# Patient Record
Sex: MALE | Race: WHITE | NOT HISPANIC OR LATINO | Employment: OTHER | URBAN - METROPOLITAN AREA
[De-identification: names, ages, dates, MRNs, and addresses within clinical notes are randomized per-mention and may not be internally consistent; named-entity substitution may affect disease eponyms.]

---

## 2017-01-03 ENCOUNTER — INITIAL CONSULT (OUTPATIENT)
Dept: DERMATOLOGY | Facility: CLINIC | Age: 82
End: 2017-01-03
Payer: MEDICARE

## 2017-01-03 VITALS — BODY MASS INDEX: 22.07 KG/M2 | HEIGHT: 74 IN | WEIGHT: 172 LBS

## 2017-01-03 DIAGNOSIS — Z80.8 FAMILY HISTORY OF MELANOMA: ICD-10-CM

## 2017-01-03 DIAGNOSIS — D22.9 MULTIPLE BENIGN NEVI: ICD-10-CM

## 2017-01-03 DIAGNOSIS — L57.0 ACTINIC KERATOSES: Primary | ICD-10-CM

## 2017-01-03 DIAGNOSIS — L82.1 SEBORRHEIC KERATOSIS: ICD-10-CM

## 2017-01-03 DIAGNOSIS — L85.3 XEROSIS CUTIS: ICD-10-CM

## 2017-01-03 PROCEDURE — 99203 OFFICE O/P NEW LOW 30 MIN: CPT | Mod: 25,S$PBB,, | Performed by: DERMATOLOGY

## 2017-01-03 PROCEDURE — 17000 DESTRUCT PREMALG LESION: CPT | Mod: S$PBB,,, | Performed by: DERMATOLOGY

## 2017-01-03 PROCEDURE — 99999 PR PBB SHADOW E&M-EST. PATIENT-LVL II: CPT | Mod: PBBFAC,,, | Performed by: DERMATOLOGY

## 2017-01-03 PROCEDURE — 17000 DESTRUCT PREMALG LESION: CPT | Mod: PBBFAC,PO | Performed by: DERMATOLOGY

## 2017-01-03 PROCEDURE — 17003 DESTRUCT PREMALG LES 2-14: CPT | Mod: S$PBB,,, | Performed by: DERMATOLOGY

## 2017-01-03 PROCEDURE — 17003 DESTRUCT PREMALG LES 2-14: CPT | Mod: PBBFAC,PO | Performed by: DERMATOLOGY

## 2017-01-03 PROCEDURE — 99212 OFFICE O/P EST SF 10 MIN: CPT | Mod: PBBFAC,PO | Performed by: DERMATOLOGY

## 2017-01-03 NOTE — LETTER
January 3, 2017      Jass Dominguez MD  [address] [facility]  [address]  [facility] [address]           [place] [facility]  [address] [facility] [place] [address]  Phone: [phone]  Fax: [phone]          Patient: Farnaz Jarquin   MR Number: [id number]   YOB: 1935   Date of Visit: 1/3/2017       Dear Dr. Jass Dominguez:    Thank you for referring Farnaz Jarquin to me for evaluation. Attached you will find relevant portions of my assessment and plan of care.    If you have questions, please do not hesitate to call me. I look forward to following Farnaz Jarquin along with you.    Sincerely,    Coleen Pizarro MD    Enclosure  CC:  No Recipients    If you would like to receive this communication electronically, please contact [email or web address] or [phone] to request more information on [employer] Link access.    For providers and/or their staff who would like to refer a patient to [facility], please contact us through our one-stop-shop provider referral line, [facility], [place], at [phone].    If you feel you have received this communication in error or would no longer like to receive these types of communications, please e-mail [email or web address]

## 2017-01-03 NOTE — PROGRESS NOTES
Subjective:       Patient ID:  Farnaz Jarquin is a 81 y.o. male who presents for   Chief Complaint   Patient presents with    Skin Check     HPI Comments: Pt here for IV skin check. 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      Also c/o dry skin, bilateral ankles. No tx. Asx.   Rough skin upper forehead, months, asx, no tx    Objective:    Physical Exam   Constitutional: He appears well-developed and well-nourished. No distress.   HENT:   Head:       Mouth/Throat: Lips normal.    Eyes: Lids are normal.  No conjunctival no injection.   Cardiovascular: There is no local extremity swelling and no dependent edema.     Neurological: He is alert and oriented to person, place, and time. He is not disoriented.   Psychiatric: He has a normal mood and affect.   Skin:   Areas Examined (abnormalities noted in diagram):   Scalp / Hair Palpated and Inspected  Head / Face Inspection Performed  Neck Inspection Performed  Chest / Axilla Inspection Performed  Abdomen Inspection Performed  Back Inspection Performed  RUE Inspected  LUE Inspection Performed  RLE Inspected  LLE Inspection Performed              Diagram Legend     Erythematous scaling macule/papule c/w actinic keratosis       Vascular papule c/w angioma      Pigmented verrucoid papule/plaque c/w seborrheic keratosis      Yellow umbilicated papule c/w sebaceous hyperplasia      Irregularly shaped tan macule c/w lentigo     1-2 mm smooth white papules consistent with Milia      Movable subcutaneous cyst with punctum c/w epidermal  inclusion cyst      Subcutaneous movable cyst c/w pilar cyst      Firm pink to brown papule c/w dermatofibroma      Pedunculated fleshy papule(s) c/w skin tag(s)      Evenly pigmented macule c/w junctional nevus     Mildly variegated pigmented, slightly irregular-bordered macule c/w mildly atypical nevus      Flesh colored to evenly pigmented papule c/w intradermal nevus       Pink pearly papule/plaque c/w basal cell carcinoma      Erythematous hyperkeratotic cursted plaque c/w SCC      Surgical scar with no sign of skin cancer recurrence      Open and closed comedones      Inflammatory papules and pustules      Verrucoid papule consistent consistent with wart     Erythematous eczematous patches and plaques     Dystrophic onycholytic nail with subungual debris c/w onychomycosis     Umbilicated papule    Erythematous-base heme-crusted tan verrucoid plaque consistent with inflamed seborrheic keratosis     Erythematous Silvery Scaling Plaque c/w Psoriasis     See annotation      Assessment / Plan:        Actinic keratoses  Cryosurgery Procedure Note    Verbal consent from the patient is obtained and the patient is aware of the precancerous quality and need for treatment of these lesions. Liquid nitrogen cryosurgery is applied to the 2 actinic keratoses, as detailed in the physical exam, to produce a freeze injury. The patient is aware that blisters may form and is instructed on wound care with gentle cleansing and use of vaseline ointment to keep moist until healed. The patient is supplied a handout on cryosurgery and is instructed to call if lesions do not completely resolve. Discussed risk postinflammatory pigmentary changes.       Seborrheic keratosis, forehead and trunk  These are benign inherited growths without a malignant potential. Reassurance given to patient. No treatment is necessary.       Multiple benign nevi, trunk and extremities  Discussed ABCDE's of nevi.  Monitor for new mole or moles that are  becoming bigger, darker, irritated, or developing irregular borders.       Xerosis cutis  Discussed the following dry skin tips:    Avoid hot baths and showers, keep showers or baths brief (10-15 min), use a mild soap or cleanser, pat skin dry after showering and apply a moisturizer within 3 minutes of getting out of bath (cetaphil cream, ceraVe, aquaphor) and reapply moisturizers 2-4 times/day.     Family history melanoma    Total body skin examination performed today including at least 12 points as noted in physical examination. No lesions suspicious for malignancy noted.           Return in about 6 months (around 7/3/2017).

## 2017-01-11 ENCOUNTER — OFFICE VISIT (OUTPATIENT)
Dept: SURGERY | Facility: CLINIC | Age: 82
End: 2017-01-11
Payer: MEDICARE

## 2017-01-11 ENCOUNTER — CLINICAL SUPPORT (OUTPATIENT)
Dept: DERMATOLOGY | Facility: CLINIC | Age: 82
End: 2017-01-11
Payer: MEDICARE

## 2017-01-11 VITALS
BODY MASS INDEX: 20.71 KG/M2 | HEIGHT: 74 IN | BODY MASS INDEX: 20.66 KG/M2 | WEIGHT: 161 LBS | SYSTOLIC BLOOD PRESSURE: 141 MMHG | TEMPERATURE: 98 F | HEART RATE: 71 BPM | DIASTOLIC BLOOD PRESSURE: 88 MMHG | HEIGHT: 74 IN | WEIGHT: 161.38 LBS

## 2017-01-11 DIAGNOSIS — R35.1 NOCTURIA: ICD-10-CM

## 2017-01-11 DIAGNOSIS — Z09 POSTOP CHECK: Primary | ICD-10-CM

## 2017-01-11 DIAGNOSIS — T14.8XXD WOUND HEALING, DELAYED: Primary | ICD-10-CM

## 2017-01-11 PROCEDURE — 99999 PR PBB SHADOW E&M-EST. PATIENT-LVL II: CPT | Mod: PBBFAC,,,

## 2017-01-11 PROCEDURE — 99999 PR PBB SHADOW E&M-EST. PATIENT-LVL III: CPT | Mod: PBBFAC,,, | Performed by: SURGERY

## 2017-01-11 PROCEDURE — 99212 OFFICE O/P EST SF 10 MIN: CPT | Mod: PBBFAC,27,PO

## 2017-01-11 PROCEDURE — 99024 POSTOP FOLLOW-UP VISIT: CPT | Mod: ,,, | Performed by: SURGERY

## 2017-01-11 RX ORDER — MUPIROCIN 20 MG/G
OINTMENT TOPICAL
Qty: 30 G | Refills: 2 | Status: SHIPPED | OUTPATIENT
Start: 2017-01-11 | End: 2017-04-03

## 2017-01-11 NOTE — PROGRESS NOTES
Cc: post op    HPI: 81 y.o.  male  6 weeks s/p open L inguinal hernia repair.   Pt notes that he is feeling well.  No pain.  No n/v.  Cont to have issues with urinary incontinece at night.      PE: AFVSS    AAOx3  CTA  Soft/NT/nd  Inc: c/d/i      L testicle slightly raised surrounding induration getting better    A/P:   Pt doing well post surgery.   F/U with me prn  Refer to urology

## 2017-01-11 NOTE — PROGRESS NOTES
"Freeze injury from cryo for Actinic keratoses 1/03/2017 by Dr. Pizarro.  Wound bed red with minor inflammation and IZZY.  States has been putting a "curing" ointment.     Stop otc ointment. Will send bactroban ointment to pharmacy. Recheck wound in 4-6 weeks  "

## 2017-01-11 NOTE — MR AVS SNAPSHOT
[facility]  [address]  [facility] [address]  Phone: [phone]                  Farnaz Jarquin   2017 1:00 PM   Office Visit    Description:  Male : 1935   Provider:  Reymundo Smith MD   Department:  [facility]           Reason for Visit     Post-op Evaluation           Diagnoses this Visit        Comments    Postop check    -  Primary            To Do List           Future Appointments        Provider Department Dept Phone    2017 2:00 PM [facility] Ophthalmology [phone]    2/10/2017 10:00 AM LAB, [place] [facility] [phone]    2/10/2017 10:15 AM URINE [facility] [phone]    4/3/2017 10:15 AM LAB, [place] [facility] [phone]    4/3/2017 10:45 AM [facility] US2 [facility] [phone]      Goals (5 Years of Data)     None      [facility]     [facility] On [facility] Nurse Care Line -  Assistance  Registered nurses in the [facility] provide clinical advisement, health education, appointment booking, and other advisory services.  Call for this free service at [phone].             Medications           Message regarding Medications     Verify the changes and/or additions to your medication regime listed below are the same as discussed with your clinician today.  If any of these changes or additions are incorrect, please notify your healthcare provider.             Verify that the below list of medications is an accurate representation of the medications you are currently taking.  If none reported, the list may be blank. If incorrect, please contact your healthcare provider. Carry this list with you in case of emergency.           Current Medications     CARBOXYMETHYLCELLULOSE SODIUM (LUBRICANT EYE DROPS OPHT) Apply to eye.    COMBIGAN 0.2-0.5 % Drop INSTILL 1 DROP INTO IN LEFT EYE TWICE DAILY    multivitamin capsule  "Take 1 capsule by mouth once daily.    tamsulosin (FLOMAX) 0.4 mg Cp24 Take 1 capsule (0.4 mg total) by mouth once daily.           Clinical Reference Information           Vital Signs - Last Recorded  Most recent update: 1/11/2017  1:14 PM by Freddy David MA    BP Pulse Temp Ht Wt BMI    (!) 141/88 71 98.2 °F (36.8 °C) (Oral) 6' 2" (1.88 m) 73.2 kg (161 lb 6 oz) 20.72 kg/m2      Blood Pressure          Most Recent Value    BP  (!)  141/88      Allergies as of 1/11/2017     No Known Allergies      Immunizations Administered on Date of Encounter - 1/11/2017     None      "

## 2017-01-13 ENCOUNTER — CLINICAL SUPPORT (OUTPATIENT)
Dept: OPHTHALMOLOGY | Facility: CLINIC | Age: 82
End: 2017-01-13
Attending: OPTOMETRIST
Payer: MEDICARE

## 2017-01-13 DIAGNOSIS — H40.001 GLAUCOMA SUSPECT, RIGHT EYE: ICD-10-CM

## 2017-01-13 PROCEDURE — 92083 EXTENDED VISUAL FIELD XM: CPT | Mod: PBBFAC,PO

## 2017-01-13 PROCEDURE — 92083 EXTENDED VISUAL FIELD XM: CPT | Mod: 26,S$PBB,, | Performed by: OPTOMETRIST

## 2017-02-02 ENCOUNTER — OFFICE VISIT (OUTPATIENT)
Dept: UROLOGY | Facility: CLINIC | Age: 82
End: 2017-02-02
Payer: MEDICARE

## 2017-02-02 ENCOUNTER — CLINICAL SUPPORT (OUTPATIENT)
Dept: DERMATOLOGY | Facility: CLINIC | Age: 82
End: 2017-02-02
Payer: MEDICARE

## 2017-02-02 VITALS
HEIGHT: 74 IN | WEIGHT: 160.94 LBS | DIASTOLIC BLOOD PRESSURE: 80 MMHG | HEART RATE: 51 BPM | BODY MASS INDEX: 20.65 KG/M2 | SYSTOLIC BLOOD PRESSURE: 120 MMHG

## 2017-02-02 DIAGNOSIS — N13.8 BPH (BENIGN PROSTATIC HYPERTROPHY) WITH URINARY OBSTRUCTION: ICD-10-CM

## 2017-02-02 DIAGNOSIS — N30.90 CYSTITIS WITHOUT HEMATURIA: ICD-10-CM

## 2017-02-02 DIAGNOSIS — N40.1 BPH (BENIGN PROSTATIC HYPERTROPHY) WITH URINARY OBSTRUCTION: ICD-10-CM

## 2017-02-02 DIAGNOSIS — Z51.89 ENCOUNTER FOR WOUND RE-CHECK: Primary | ICD-10-CM

## 2017-02-02 DIAGNOSIS — R33.9 URINARY RETENTION: ICD-10-CM

## 2017-02-02 DIAGNOSIS — N39.498 OTHER URINARY INCONTINENCE: Primary | ICD-10-CM

## 2017-02-02 LAB
BILIRUB SERPL-MCNC: NEGATIVE MG/DL
BLOOD URINE, POC: 250
COLOR, POC UA: YELLOW
GLUCOSE UR QL STRIP: NORMAL
KETONES UR QL STRIP: NEGATIVE
LEUKOCYTE ESTERASE URINE, POC: NORMAL
NITRITE, POC UA: NORMAL
PH, POC UA: 5
POC RESIDUAL URINE VOLUME: 429 ML (ref 0–100)
PROTEIN, POC: NORMAL
SPECIFIC GRAVITY, POC UA: 1.01
UROBILINOGEN, POC UA: NORMAL

## 2017-02-02 PROCEDURE — 81002 URINALYSIS NONAUTO W/O SCOPE: CPT | Mod: PBBFAC,PO | Performed by: UROLOGY

## 2017-02-02 PROCEDURE — 51798 US URINE CAPACITY MEASURE: CPT | Mod: PBBFAC,PO | Performed by: UROLOGY

## 2017-02-02 PROCEDURE — 99204 OFFICE O/P NEW MOD 45 MIN: CPT | Mod: S$PBB,,, | Performed by: UROLOGY

## 2017-02-02 PROCEDURE — 87088 URINE BACTERIA CULTURE: CPT

## 2017-02-02 PROCEDURE — 87086 URINE CULTURE/COLONY COUNT: CPT

## 2017-02-02 PROCEDURE — 87186 SC STD MICRODIL/AGAR DIL: CPT

## 2017-02-02 PROCEDURE — 99213 OFFICE O/P EST LOW 20 MIN: CPT | Mod: PBBFAC,PO | Performed by: UROLOGY

## 2017-02-02 PROCEDURE — 87077 CULTURE AEROBIC IDENTIFY: CPT

## 2017-02-02 PROCEDURE — 99999 PR PBB SHADOW E&M-EST. PATIENT-LVL III: CPT | Mod: PBBFAC,,, | Performed by: UROLOGY

## 2017-02-02 RX ORDER — TAMSULOSIN HYDROCHLORIDE 0.4 MG/1
0.4 CAPSULE ORAL DAILY
Qty: 30 CAPSULE | Refills: 11 | Status: SHIPPED | OUTPATIENT
Start: 2017-02-02 | End: 2017-08-09

## 2017-02-02 RX ORDER — CIPROFLOXACIN 500 MG/1
500 TABLET ORAL 2 TIMES DAILY
Qty: 20 TABLET | Refills: 0 | Status: SHIPPED | OUTPATIENT
Start: 2017-02-02 | End: 2017-02-12

## 2017-02-02 NOTE — PROGRESS NOTES
Subjective:       Patient ID:  Farnaz Jarquin is a 81 y.o. male who presents for   Chief Complaint   Patient presents with    Wound Check     HPI Comments: Presents today for wound check R arm. S/P cryo 1-3-17 . Site appears pink in color, dry & intact  with no signs or symptoms of infection. Pt advised to contact ofc if needed. 6 month f/u booked & appt handed to pt .     Wound Check         Review of Systems   Hematologic/Lymphatic: Bruises/bleeds easily.        Objective:    Physical Exam       Diagram Legend     Erythematous scaling macule/papule c/w actinic keratosis       Vascular papule c/w angioma      Pigmented verrucoid papule/plaque c/w seborrheic keratosis      Yellow umbilicated papule c/w sebaceous hyperplasia      Irregularly shaped tan macule c/w lentigo     1-2 mm smooth white papules consistent with Milia      Movable subcutaneous cyst with punctum c/w epidermal inclusion cyst      Subcutaneous movable cyst c/w pilar cyst      Firm pink to brown papule c/w dermatofibroma      Pedunculated fleshy papule(s) c/w skin tag(s)      Evenly pigmented macule c/w junctional nevus     Mildly variegated pigmented, slightly irregular-bordered macule c/w mildly atypical nevus      Flesh colored to evenly pigmented papule c/w intradermal nevus       Pink pearly papule/plaque c/w basal cell carcinoma      Erythematous hyperkeratotic cursted plaque c/w SCC      Surgical scar with no sign of skin cancer recurrence      Open and closed comedones      Inflammatory papules and pustules      Verrucoid papule consistent consistent with wart     Erythematous eczematous patches and plaques     Dystrophic onycholytic nail with subungual debris c/w onychomycosis     Umbilicated papule    Erythematous-base heme-crusted tan verrucoid plaque consistent with inflamed seborrheic keratosis     Erythematous Silvery Scaling Plaque c/w Psoriasis     See annotation      Assessment / Plan:        There are no diagnoses linked to  this encounter.         No Follow-up on file.
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
## 2017-02-02 NOTE — LETTER
February 2, 2017      Reymundo Smith MD  [address] [place] [address]           [place] [facility]  [address] [place] [address]  Phone: [phone]          Patient: Farnaz Jarquin   MR Number: [id number]   YOB: 1935   Date of Visit: 2/2/2017       Dear Dr. Reymundo Smith:    Thank you for referring Farnaz Jarquin to me for evaluation. Attached you will find relevant portions of my assessment and plan of care.    If you have questions, please do not hesitate to call me. I look forward to following Farnaz Jarquin along with you.    Sincerely,    RHEA Ramos MD    Enclosure  CC:  No Recipients    If you would like to receive this communication electronically, please contact [email or web address] or [phone] to request more information on [employer] Link access.    For providers and/or their staff who would like to refer a patient to [facility], please contact us through our one-stop-shop provider referral line, [facility], at [phone].    If you feel you have received this communication in error or would no longer like to receive these types of communications, please e-mail [email or web address]

## 2017-02-06 LAB — BACTERIA UR CULT: NORMAL

## 2017-02-07 ENCOUNTER — TELEPHONE (OUTPATIENT)
Dept: UROLOGY | Facility: CLINIC | Age: 82
End: 2017-02-07

## 2017-02-07 NOTE — TELEPHONE ENCOUNTER
Patient notified of urine culture results, had to r/s office visit to Friday as patient unable to make f/u on Thursday.

## 2017-02-07 NOTE — TELEPHONE ENCOUNTER
----- Message from RHEA Ramos MD sent at 2/7/2017  8:57 AM CST -----  Call patient with culture results.   Positive for resistant organism.  Continue Cipro for now.  Keep f/u as planned this week

## 2017-02-10 ENCOUNTER — LAB VISIT (OUTPATIENT)
Dept: LAB | Facility: HOSPITAL | Age: 82
End: 2017-02-10
Attending: PEDIATRICS
Payer: MEDICARE

## 2017-02-10 ENCOUNTER — OFFICE VISIT (OUTPATIENT)
Dept: UROLOGY | Facility: CLINIC | Age: 82
End: 2017-02-10
Payer: MEDICARE

## 2017-02-10 VITALS
HEART RATE: 67 BPM | HEIGHT: 74 IN | BODY MASS INDEX: 20.65 KG/M2 | WEIGHT: 160.94 LBS | DIASTOLIC BLOOD PRESSURE: 81 MMHG | SYSTOLIC BLOOD PRESSURE: 130 MMHG

## 2017-02-10 DIAGNOSIS — N18.30 CKD (CHRONIC KIDNEY DISEASE) STAGE 3, GFR 30-59 ML/MIN: ICD-10-CM

## 2017-02-10 DIAGNOSIS — N30.90 CYSTITIS WITHOUT HEMATURIA: Primary | ICD-10-CM

## 2017-02-10 DIAGNOSIS — R33.9 URINARY RETENTION: ICD-10-CM

## 2017-02-10 DIAGNOSIS — N13.8 BPH (BENIGN PROSTATIC HYPERTROPHY) WITH URINARY OBSTRUCTION: ICD-10-CM

## 2017-02-10 DIAGNOSIS — N40.1 BPH (BENIGN PROSTATIC HYPERTROPHY) WITH URINARY OBSTRUCTION: ICD-10-CM

## 2017-02-10 LAB
ALBUMIN SERPL BCP-MCNC: 3.7 G/DL
ANION GAP SERPL CALC-SCNC: 7 MMOL/L
BILIRUB SERPL-MCNC: ABNORMAL MG/DL
BLOOD URINE, POC: ABNORMAL
BUN SERPL-MCNC: 26 MG/DL
CALCIUM SERPL-MCNC: 9.5 MG/DL
CHLORIDE SERPL-SCNC: 107 MMOL/L
CO2 SERPL-SCNC: 27 MMOL/L
COLOR, POC UA: YELLOW
CREAT SERPL-MCNC: 2.2 MG/DL
EST. GFR  (AFRICAN AMERICAN): 31.5 ML/MIN/1.73 M^2
EST. GFR  (NON AFRICAN AMERICAN): 27.3 ML/MIN/1.73 M^2
GLUCOSE SERPL-MCNC: 86 MG/DL
GLUCOSE UR QL STRIP: ABNORMAL
KETONES UR QL STRIP: ABNORMAL
LEUKOCYTE ESTERASE URINE, POC: ABNORMAL
NITRITE, POC UA: ABNORMAL
PH, POC UA: 5
PHOSPHATE SERPL-MCNC: 3.8 MG/DL
POC RESIDUAL URINE VOLUME: 185 ML (ref 0–100)
POTASSIUM SERPL-SCNC: 4.7 MMOL/L
PROTEIN, POC: ABNORMAL
PTH-INTACT SERPL-MCNC: 124 PG/ML
SODIUM SERPL-SCNC: 141 MMOL/L
SPECIFIC GRAVITY, POC UA: 1.01
UROBILINOGEN, POC UA: ABNORMAL

## 2017-02-10 PROCEDURE — 87086 URINE CULTURE/COLONY COUNT: CPT

## 2017-02-10 PROCEDURE — 81002 URINALYSIS NONAUTO W/O SCOPE: CPT | Mod: PBBFAC,PO | Performed by: UROLOGY

## 2017-02-10 PROCEDURE — 99999 PR PBB SHADOW E&M-EST. PATIENT-LVL III: CPT | Mod: PBBFAC,,, | Performed by: UROLOGY

## 2017-02-10 PROCEDURE — 99213 OFFICE O/P EST LOW 20 MIN: CPT | Mod: S$PBB,,, | Performed by: UROLOGY

## 2017-02-10 PROCEDURE — 51798 US URINE CAPACITY MEASURE: CPT | Mod: PBBFAC,PO | Performed by: UROLOGY

## 2017-02-10 PROCEDURE — 99213 OFFICE O/P EST LOW 20 MIN: CPT | Mod: PBBFAC,PO | Performed by: UROLOGY

## 2017-02-10 NOTE — PROGRESS NOTES
Subjective:       Patient ID: Farnaz Jarquin is a 81 y.o. male.    Chief Complaint: Follow-up (1 week with PVR)    HPI     81 year old recent left hernia repair. Since surgery he has had more difficulty voiding.  He denies hematuria and dsyuria.  Urine culture was neg.  He was given Flomax and his symptoms are   improved.  Repeat PVR is 185 ml with is improved from last week.    Urine dipstick shows negative for nitrites, glucose, protein, positive for 1+leukocytes, trace blood    Review of Systems   Constitutional: Negative for fever.   Genitourinary: Negative for dysuria and hematuria.       Objective:      Physical Exam   Constitutional: He is oriented to person, place, and time. He appears well-developed and well-nourished.   Pulmonary/Chest: Effort normal.   Abdominal: Soft.   Neurological: He is alert and oriented to person, place, and time.   Skin: No rash noted.   Psychiatric: He has a normal mood and affect.   Vitals reviewed.      Assessment:       1. Cystitis without hematuria    2. BPH (benign prostatic hypertrophy) with urinary obstruction    3. Urinary retention        Plan:       Cystitis without hematuria  -     Urine culture  -     POCT URINE DIPSTICK WITHOUT MICROSCOPE  -     POCT Bladder Scan    BPH (benign prostatic hypertrophy) with urinary obstruction  -     POCT URINE DIPSTICK WITHOUT MICROSCOPE  -     POCT Bladder Scan    Urinary retention  -     POCT URINE DIPSTICK WITHOUT MICROSCOPE  -     POCT Bladder Scan      Continue Flomax.  Follow-up 1 month with flow rate and postvoid void residual.

## 2017-02-10 NOTE — MR AVS SNAPSHOT
[facility]  [address]  [facility] [address]  Phone: [phone]                  Farnaz Jarquin   2/10/2017 1:45 PM   Office Visit    Description:  Male : 1935   Provider:  RHEA Ramos MD   Department:  [facility]           Reason for Visit     Follow-up           Diagnoses this Visit        Comments    Cystitis without hematuria    -  Primary     BPH (benign prostatic hypertrophy) with urinary obstruction         Urinary retention                To Do List           Future Appointments        Provider Department Dept Phone    3/22/2017 2:45 PM RHEA Ramos MD [facility] [phone]    4/3/2017 10:15 AM LAB, [place] [facility] [phone]    4/3/2017 10:45 AM [facility] US2 [facility] [phone]    4/10/2017 2:20 PM Jass Dominguez MD [facility] Nephrology [phone]    2017 1:45 PM Coleen Pizarro MD [facility] Dermatology [phone]      Goals (5 Years of Data)     None      [facility]     [facility] On [facility] Nurse Care Line - 24/7 Assistance  Registered nurses in the [facility] provide clinical advisement, health education, appointment booking, and other advisory services.  Call for this free service at [phone].             Medications           Message regarding Medications     Verify the changes and/or additions to your medication regime listed below are the same as discussed with your clinician today.  If any of these changes or additions are incorrect, please notify your healthcare provider.             Verify that the below list of medications is an accurate representation of the medications you are currently taking.  If none reported, the list may be blank. If incorrect, please contact your healthcare provider. Carry this list with you in case of emergency.           Current Medications     CARBOXYMETHYLCELLULOSE SODIUM (LUBRICANT EYE DROPS OPHT) Apply to eye.     "ciprofloxacin HCl (CIPRO) 500 MG tablet Take 1 tablet (500 mg total) by mouth 2 (two) times daily.    COMBIGAN 0.2-0.5 % Drop INSTILL 1 DROP INTO IN LEFT EYE TWICE DAILY    multivitamin capsule Take 1 capsule by mouth once daily.    mupirocin (BACTROBAN) 2 % ointment AAA bid    tamsulosin (FLOMAX) 0.4 mg Cp24 Take 1 capsule (0.4 mg total) by mouth once daily.           Clinical Reference Information           Your Vitals Were     BP Pulse Height Weight BMI    130/81 67 6' 2" (1.88 m) 73 kg (160 lb 15 oz) 20.66 kg/m2      Blood Pressure          Most Recent Value    BP  130/81      Allergies as of 2/10/2017     No Known Allergies      Immunizations Administered on Date of Encounter - 2/10/2017     None      Orders Placed During Today's Visit      Normal Orders This Visit    Urine culture       Language Assistance Services     ATTENTION: Language assistance services are available, free of charge. Please call [phone].      ATENCIÓN: Si habla pedro, tiene a masters disposición servicios gratuitos de asistencia lingüística. Llame al [phone].     REUBEN Ý: N?u b?n nói Ti?ng Vi?t, có các d?ch v? h? tr? ngôn ng? mi?n phí dành cho b?n. G?i s? [phone].         [facility] complies with applicable Federal civil rights laws and does not discriminate on the basis of race, color, national origin, age, disability, or sex.        "

## 2017-02-11 LAB — BACTERIA UR CULT: NO GROWTH

## 2017-03-15 RX ORDER — BRIMONIDINE TARTRATE, TIMOLOL MALEATE 2; 5 MG/ML; MG/ML
SOLUTION/ DROPS OPHTHALMIC
Qty: 10 ML | Refills: 0 | Status: SHIPPED | OUTPATIENT
Start: 2017-03-15 | End: 2017-08-03 | Stop reason: SDUPTHER

## 2017-03-22 ENCOUNTER — OFFICE VISIT (OUTPATIENT)
Dept: UROLOGY | Facility: CLINIC | Age: 82
End: 2017-03-22
Payer: MEDICARE

## 2017-03-22 VITALS
HEIGHT: 74 IN | BODY MASS INDEX: 20.26 KG/M2 | HEART RATE: 76 BPM | WEIGHT: 157.88 LBS | DIASTOLIC BLOOD PRESSURE: 62 MMHG | SYSTOLIC BLOOD PRESSURE: 145 MMHG

## 2017-03-22 DIAGNOSIS — N40.0 BENIGN PROSTATIC HYPERPLASIA, PRESENCE OF LOWER URINARY TRACT SYMPTOMS UNSPECIFIED, UNSPECIFIED MORPHOLOGY: Primary | ICD-10-CM

## 2017-03-22 DIAGNOSIS — N30.90 CYSTITIS WITHOUT HEMATURIA: ICD-10-CM

## 2017-03-22 LAB
BILIRUB SERPL-MCNC: ABNORMAL MG/DL
BLOOD URINE, POC: ABNORMAL
COLOR, POC UA: YELLOW
GLUCOSE UR QL STRIP: ABNORMAL
KETONES UR QL STRIP: ABNORMAL
LEUKOCYTE ESTERASE URINE, POC: ABNORMAL
NITRITE, POC UA: ABNORMAL
PH, POC UA: 5
PROTEIN, POC: ABNORMAL
SPECIFIC GRAVITY, POC UA: 1
UROBILINOGEN, POC UA: ABNORMAL

## 2017-03-22 PROCEDURE — 99213 OFFICE O/P EST LOW 20 MIN: CPT | Mod: PBBFAC,PO | Performed by: UROLOGY

## 2017-03-22 PROCEDURE — 99999 PR PBB SHADOW E&M-EST. PATIENT-LVL III: CPT | Mod: PBBFAC,,, | Performed by: UROLOGY

## 2017-03-22 PROCEDURE — 87088 URINE BACTERIA CULTURE: CPT

## 2017-03-22 PROCEDURE — 87086 URINE CULTURE/COLONY COUNT: CPT

## 2017-03-22 PROCEDURE — 99213 OFFICE O/P EST LOW 20 MIN: CPT | Mod: S$PBB,,, | Performed by: UROLOGY

## 2017-03-22 PROCEDURE — 81002 URINALYSIS NONAUTO W/O SCOPE: CPT | Mod: PBBFAC,PO | Performed by: UROLOGY

## 2017-03-22 PROCEDURE — 87186 SC STD MICRODIL/AGAR DIL: CPT

## 2017-03-22 PROCEDURE — 87077 CULTURE AEROBIC IDENTIFY: CPT

## 2017-03-22 RX ORDER — CIPROFLOXACIN 500 MG/1
500 TABLET ORAL 2 TIMES DAILY
Qty: 28 TABLET | Refills: 0 | Status: SHIPPED | OUTPATIENT
Start: 2017-03-22 | End: 2017-03-22 | Stop reason: ALTCHOICE

## 2017-03-22 RX ORDER — AMOXICILLIN AND CLAVULANATE POTASSIUM 875; 125 MG/1; MG/1
1 TABLET, FILM COATED ORAL 2 TIMES DAILY
Qty: 28 TABLET | Refills: 0 | Status: SHIPPED | OUTPATIENT
Start: 2017-03-22 | End: 2017-04-03

## 2017-03-22 NOTE — MR AVS SNAPSHOT
[facility]  [address]  [facility] [address]  Phone: [phone]                  Farnaz Jarquin   3/22/2017 2:45 PM   Office Visit    Description:  Male : 1935   Provider:  RHEA Ramos MD   Department:  [facility]           Reason for Visit     Benign Prostatic Hypertrophy                To Do List           Future Appointments        Provider Department Dept Phone    4/3/2017 10:15 AM LAB, [place] [facility] [phone]    4/3/2017 10:45 AM [facility] [phone]    4/10/2017 2:20 PM Jass Dominguez MD [facility] [phone]    2017 1:45 PM Coleen Pizarro MD [facility] [phone]      Goals (5 Years of Data)     None      [facility]     [facility] Nurse Care Line -  Assistance  Registered nurses in the [facility] provide clinical advisement, health education, appointment booking, and other advisory services.  Call for this free service at [phone].             Medications           Message regarding Medications     Verify the changes and/or additions to your medication regime listed below are the same as discussed with your clinician today.  If any of these changes or additions are incorrect, please notify your healthcare provider.             Verify that the below list of medications is an accurate representation of the medications you are currently taking.  If none reported, the list may be blank. If incorrect, please contact your healthcare provider. Carry this list with you in case of emergency.           Current Medications     CARBOXYMETHYLCELLULOSE SODIUM (LUBRICANT EYE DROPS OPHT) Apply to eye.    COMBIGAN 0.2-0.5 % Drop INSTILL 1 DROP INTO LEFT EYE TWICE DAILY    multivitamin capsule Take 1 capsule by mouth once daily.    mupirocin (BACTROBAN) 2 % ointment AAA bid    tamsulosin (FLOMAX) 0.4 mg Cp24 Take 1 capsule (0.4 mg total) by  "mouth once daily.           Clinical Reference Information           Your Vitals Were     BP Pulse Height Weight BMI    145/62 (BP Location: Right arm) 76 6' 2" (1.88 m) 71.6 kg (157 lb 13.6 oz) 20.27 kg/m2      Blood Pressure          Most Recent Value    BP  (!)  145/62      Allergies as of 3/22/2017     No Known Allergies      Immunizations Administered on Date of Encounter - 3/22/2017     None      Language Assistance Services     ATTENTION: Language assistance services are available, free of charge. Please call [phone].      ATENCIÓN: Si habla español, tiene a masters disposición servicios gratuitos de asistencia lingüística. Llame al [phone].     CHÚ Ý: N?u b?n nói Ti?ng Vi?t, có các d?ch v? h? tr? ngôn ng? mi?n phí dành cho b?n. G?i s? [phone].         [facility] complies with applicable Federal civil rights laws and does not discriminate on the basis of race, color, national origin, age, disability, or sex.        "

## 2017-03-22 NOTE — PROGRESS NOTES
Subjective:       Patient ID: Farnaz Jarquin is a 81 y.o. male.    Chief Complaint: Benign Prostatic Hypertrophy    HPI     81 year old left hernia repair last month.  After surgery he has had more difficulty voiding.  He was given Flomax and was doing well.  He was scheduled for follow-up and FR and PVR.  He says in the last 2 days his symptoms have changed.  He is having more urinary frequency and low back pain.  He denies hematuria and dsyuria.   Previous urine culture grew staph epi and he was treated with a course of Cipro.   Urine dipstick shows positive for nitrates.  Micro exam: 15-20 WBC's per HPF and 2+ bacteria.    Review of Systems   Constitutional: Negative for fever.   Genitourinary: Negative for dysuria and hematuria.       Objective:      Physical Exam   Constitutional: He is oriented to person, place, and time. He appears well-developed and well-nourished.   Pulmonary/Chest: Effort normal.   Abdominal: Soft.   Neurological: He is alert and oriented to person, place, and time.   Skin: No rash noted.   Psychiatric: He has a normal mood and affect.   Vitals reviewed.      Assessment:       1. Benign prostatic hyperplasia, presence of lower urinary tract symptoms unspecified, unspecified morphology    2. Cystitis without hematuria        Plan:       Benign prostatic hyperplasia, presence of lower urinary tract symptoms unspecified, unspecified morphology  -     POCT URINE DIPSTICK WITHOUT MICROSCOPE    Cystitis without hematuria  -     Urine culture  -     Discontinue: ciprofloxacin HCl (CIPRO) 500 MG tablet; Take 1 tablet (500 mg total) by mouth 2 (two) times daily.  Dispense: 28 tablet; Refill: 0    Other orders  -     amoxicillin-clavulanate 875-125mg (AUGMENTIN) 875-125 mg per tablet; Take 1 tablet by mouth 2 (two) times daily.  Dispense: 28 tablet; Refill: 0      f/u 3 months

## 2017-03-25 LAB — BACTERIA UR CULT: NORMAL

## 2017-03-27 RX ORDER — NITROFURANTOIN 25; 75 MG/1; MG/1
100 CAPSULE ORAL 2 TIMES DAILY
Qty: 20 CAPSULE | Refills: 0 | Status: SHIPPED | OUTPATIENT
Start: 2017-03-27 | End: 2017-04-06

## 2017-03-28 ENCOUNTER — TELEPHONE (OUTPATIENT)
Dept: UROLOGY | Facility: CLINIC | Age: 82
End: 2017-03-28

## 2017-03-28 NOTE — TELEPHONE ENCOUNTER
----- Message from Yadi Garcia sent at 3/28/2017  9:54 AM CDT -----  Contact: Patient  Place call to pod,Patient returning a call. Please call back at [phone]. Thanks,

## 2017-03-28 NOTE — TELEPHONE ENCOUNTER
----- Message from RHEA Ramos MD sent at 3/27/2017  5:59 PM CDT -----  Call with urine culture.  Resistant to Augmentin.  Macrobid called into pharmacy

## 2017-03-30 NOTE — TELEPHONE ENCOUNTER
Spoke to pt and informed him of the urine culture results and about changing his antibiotic. Pt verbalized understanding.

## 2017-04-03 ENCOUNTER — HOSPITAL ENCOUNTER (OUTPATIENT)
Dept: RADIOLOGY | Facility: HOSPITAL | Age: 82
Discharge: HOME | End: 2017-04-03
Attending: INTERNAL MEDICINE
Payer: MEDICARE

## 2017-04-03 ENCOUNTER — TELEPHONE (OUTPATIENT)
Dept: UROLOGY | Facility: CLINIC | Age: 82
End: 2017-04-03

## 2017-04-03 ENCOUNTER — OFFICE VISIT (OUTPATIENT)
Dept: UROLOGY | Facility: CLINIC | Age: 82
End: 2017-04-03
Payer: MEDICARE

## 2017-04-03 VITALS
HEART RATE: 77 BPM | BODY MASS INDEX: 20.37 KG/M2 | HEIGHT: 74 IN | WEIGHT: 158.75 LBS | SYSTOLIC BLOOD PRESSURE: 114 MMHG | DIASTOLIC BLOOD PRESSURE: 75 MMHG

## 2017-04-03 DIAGNOSIS — N20.0 CALCIUM OXALATE KIDNEY STONES: ICD-10-CM

## 2017-04-03 DIAGNOSIS — N30.90 CYSTITIS WITHOUT HEMATURIA: Primary | ICD-10-CM

## 2017-04-03 DIAGNOSIS — N20.1 URETERAL STONE: ICD-10-CM

## 2017-04-03 DIAGNOSIS — N13.2 HYDRONEPHROSIS WITH OBSTRUCTING CALCULUS: ICD-10-CM

## 2017-04-03 DIAGNOSIS — N18.4 CKD (CHRONIC KIDNEY DISEASE) STAGE 4, GFR 15-29 ML/MIN: ICD-10-CM

## 2017-04-03 LAB
BILIRUB SERPL-MCNC: NORMAL MG/DL
BLOOD URINE, POC: NORMAL
COLOR, POC UA: YELLOW
GLUCOSE UR QL STRIP: NORMAL
KETONES UR QL STRIP: NORMAL
LEUKOCYTE ESTERASE URINE, POC: NORMAL
NITRITE, POC UA: NORMAL
PH, POC UA: 5
PROTEIN, POC: 30
SPECIFIC GRAVITY, POC UA: 1.01
UROBILINOGEN, POC UA: NORMAL

## 2017-04-03 PROCEDURE — 99999 PR PBB SHADOW E&M-EST. PATIENT-LVL III: CPT | Mod: PBBFAC,,, | Performed by: UROLOGY

## 2017-04-03 PROCEDURE — 99213 OFFICE O/P EST LOW 20 MIN: CPT | Mod: S$PBB,,, | Performed by: UROLOGY

## 2017-04-03 PROCEDURE — 99213 OFFICE O/P EST LOW 20 MIN: CPT | Mod: PBBFAC,PO | Performed by: UROLOGY

## 2017-04-03 PROCEDURE — 81002 URINALYSIS NONAUTO W/O SCOPE: CPT | Mod: PBBFAC,PO | Performed by: UROLOGY

## 2017-04-03 PROCEDURE — 76770 US EXAM ABDO BACK WALL COMP: CPT | Mod: 26,,, | Performed by: RADIOLOGY

## 2017-04-03 NOTE — PROGRESS NOTES
Subjective:       Patient ID: Farnaz Jarquin is a 81 y.o. male.    Chief Complaint: Results (ultrsound shows hydronephrosis)    HPI     81 year old left hernia repair last month. After surgery he has had more difficulty voiding. He was given Flomax and was doing well. He was scheduled for follow-up and FR and PVR.  Urine culture last week grew staph epi resistant to Cipro.  He was then given Macrobid and has taken for the last few days and he feels better.  Had renal US with showed right hydro and an obstructing right UPJ stone.  He denies flank pain.    Urine dipstick shows negative for nitrites, leukocytes, red blood cells, glucose, positive for protein.    Review of Systems   Constitutional: Negative for fever.   Genitourinary: Negative for dysuria and hematuria.       Objective:      Physical Exam   Constitutional: He is oriented to person, place, and time. He appears well-developed and well-nourished.   Pulmonary/Chest: Effort normal.   Abdominal: Soft. There is no CVA tenderness.   Neurological: He is alert and oriented to person, place, and time.   Skin: No rash noted.   Psychiatric: He has a normal mood and affect.   Vitals reviewed.      Assessment:       1. Cystitis without hematuria    2. Ureteral stone    3. Hydronephrosis with obstructing calculus        Plan:       Cystitis without hematuria  -     POCT urine dipstick without microscope    Ureteral stone  -     CT Renal Stone Study ABD Pelvis WO; Future; Expected date: 4/3/17  -     POCT urine dipstick without microscope    Hydronephrosis with obstructing calculus  -     POCT urine dipstick without microscope      CT scan today.   RTC after scan

## 2017-04-03 NOTE — MR AVS SNAPSHOT
[facility]  [address]  [facility] [address]  Phone: [phone]                  Farnaz Jarquin   4/3/2017 1:00 PM   Office Visit    Description:  Male : 1935   Provider:  RHEA Ramos MD   Department:  [facility]           Reason for Visit     Results                To Do List           Future Appointments        Provider Department Dept Phone    4/10/2017 2:20 PM Jass Dominguez MD [facility] Neph[facility] [phone]    2017 2:00 PM RHEA Ramos MD [facility] [phone]    2017 1:45 PM Coleen Pizarro MD [facility] Dermat[facility] [phone]      Goals (5 Years of Data)     None      [facility]     [facility] Care [facility] -  Assistance  Unless otherwise directed by your provider, please contact [facility], our nurse care line that is available for  assistance.     Registered nurses in the [facility] provide: appointment scheduling, clinical advisement, health education, and other advisory services.  Call: [phone] (toll free)               Medications           Message regarding Medications     Verify the changes and/or additions to your medication regime listed below are the same as discussed with your clinician today.  If any of these changes or additions are incorrect, please notify your healthcare provider.        STOP taking these medications     amoxicillin-clavulanate 875-125mg (AUGMENTIN) 875-125 mg per tablet Take 1 tablet by mouth 2 (two) times daily.    mupirocin (BACTROBAN) 2 % ointment AAA bid           Verify that the below list of medications is an accurate representation of the medications you are currently taking.  If none reported, the list may be blank. If incorrect, please contact your healthcare provider. Carry this list with you in case of emergency.           Current Medications     CARBOXYMETHYLCELLULOSE SODIUM (LUBRICANT EYE DROPS OPHT) Apply to eye.    COMBIGAN  "0.2-0.5 % Drop INSTILL 1 DROP INTO LEFT EYE TWICE DAILY    multivitamin capsule Take 1 capsule by mouth once daily.    nitrofurantoin, macrocrystal-monohydrate, (MACROBID) 100 MG capsule Take 1 capsule (100 mg total) by mouth 2 (two) times daily.    tamsulosin (FLOMAX) 0.4 mg Cp24 Take 1 capsule (0.4 mg total) by mouth once daily.           Clinical Reference Information           Your Vitals Were     BP Pulse Height Weight BMI    114/75 77 6' 2" (1.88 m) 72 kg (158 lb 11.7 oz) 20.38 kg/m2      Blood Pressure          Most Recent Value    BP  114/75      Allergies as of 4/3/2017     No Known Allergies      Immunizations Administered on Date of Encounter - 4/3/2017     None      Language Assistance Services     ATTENTION: Language assistance services are available, free of charge. Please call [phone].      ATENCIÓN: Si habla pedro, tiene a masters disposición servicios gratuitos de asistencia lingüística. Llame al [phone].     REUBEN Ý: N?u b?n nói Ti?ng Vi?t, có các d?ch v? h? tr? ngôn ng? mi?n phí dành cho b?n. G?i s? [phone].         [facility] complies with applicable Federal civil rights laws and does not discriminate on the basis of race, color, national origin, age, disability, or sex.        "

## 2017-04-03 NOTE — TELEPHONE ENCOUNTER
Spoke to pt and rescheduled him for his ct. He went to the lab desk and they told him he had all his test done for the day and he could go.

## 2017-04-04 ENCOUNTER — HOSPITAL ENCOUNTER (OUTPATIENT)
Dept: RADIOLOGY | Facility: HOSPITAL | Age: 82
Discharge: HOME | End: 2017-04-04
Attending: UROLOGY
Payer: MEDICARE

## 2017-04-04 DIAGNOSIS — N20.1 URETERAL STONE: ICD-10-CM

## 2017-04-04 PROCEDURE — 74176 CT ABD & PELVIS W/O CONTRAST: CPT | Mod: TC,PO

## 2017-04-04 PROCEDURE — 74176 CT ABD & PELVIS W/O CONTRAST: CPT | Mod: 26,,, | Performed by: RADIOLOGY

## 2017-04-05 ENCOUNTER — TELEPHONE (OUTPATIENT)
Dept: UROLOGY | Facility: CLINIC | Age: 82
End: 2017-04-05

## 2017-04-05 DIAGNOSIS — N20.1 URETERAL STONE: Primary | ICD-10-CM

## 2017-04-05 DIAGNOSIS — N20.0 KIDNEY STONE: Primary | ICD-10-CM

## 2017-04-05 NOTE — TELEPHONE ENCOUNTER
----- Message from RHEA Ramos MD sent at 4/5/2017  2:26 PM CDT -----  Call with CT scan results.  Large obstructing right ureteral stone.  I recommend ESWL.  Schedule case of follow-up if he wants to proceed.

## 2017-04-05 NOTE — TELEPHONE ENCOUNTER
Spoke to pt and I am schedule him for a Right side lithotripsy. I am mailing him instructions. I went over them with him on the phone. pT verbalized understanding.

## 2017-04-06 ENCOUNTER — TELEPHONE (OUTPATIENT)
Dept: UROLOGY | Facility: CLINIC | Age: 82
End: 2017-04-06

## 2017-04-06 NOTE — TELEPHONE ENCOUNTER
----- Message from RHEA Ramos MD sent at 4/5/2017  5:15 PM CDT -----  I have ordered a KUB to be done before lithotripsy

## 2017-04-10 ENCOUNTER — ANESTHESIA EVENT (OUTPATIENT)
Dept: SURGERY | Facility: HOSPITAL | Age: 82
End: 2017-04-10
Payer: MEDICARE

## 2017-04-10 ENCOUNTER — OFFICE VISIT (OUTPATIENT)
Dept: NEPHROLOGY | Facility: CLINIC | Age: 82
End: 2017-04-10
Payer: MEDICARE

## 2017-04-10 VITALS
TEMPERATURE: 98 F | HEART RATE: 74 BPM | HEIGHT: 74 IN | OXYGEN SATURATION: 97 % | DIASTOLIC BLOOD PRESSURE: 80 MMHG | SYSTOLIC BLOOD PRESSURE: 102 MMHG | WEIGHT: 160.94 LBS | BODY MASS INDEX: 20.65 KG/M2

## 2017-04-10 DIAGNOSIS — N20.0 CALCIUM OXALATE KIDNEY STONES: ICD-10-CM

## 2017-04-10 DIAGNOSIS — N18.4 CKD (CHRONIC KIDNEY DISEASE) STAGE 4, GFR 15-29 ML/MIN: Primary | ICD-10-CM

## 2017-04-10 DIAGNOSIS — Z79.1 NSAID LONG-TERM USE: ICD-10-CM

## 2017-04-10 DIAGNOSIS — N18.4 CKD (CHRONIC KIDNEY DISEASE), STAGE 4 (SEVERE): Primary | ICD-10-CM

## 2017-04-10 PROCEDURE — 99213 OFFICE O/P EST LOW 20 MIN: CPT | Mod: PBBFAC,PO | Performed by: INTERNAL MEDICINE

## 2017-04-10 PROCEDURE — 99213 OFFICE O/P EST LOW 20 MIN: CPT | Mod: S$PBB,,, | Performed by: INTERNAL MEDICINE

## 2017-04-10 PROCEDURE — 99499 UNLISTED E&M SERVICE: CPT | Mod: S$PBB,,, | Performed by: INTERNAL MEDICINE

## 2017-04-10 PROCEDURE — 99999 PR PBB SHADOW E&M-EST. PATIENT-LVL III: CPT | Mod: PBBFAC,,, | Performed by: INTERNAL MEDICINE

## 2017-04-10 NOTE — PROGRESS NOTES
Subjective:       Patient ID: Farnaz Jarquin is a 81 y.o. White male who presents for follow-up evaluation of Chronic Kidney Disease    HPI     He underwent renal u/s and was noted to have right sided hydro from a stone. He is scheduled tomorrow for lithotripsy tomorrow. No hematuria, no flank pain and no fever. His appetite has decreased, lost 12 lbs since last visit. No edema nor SOB. No further NSAID use    Review of Systems   Constitutional: Positive for appetite change and unexpected weight change. Negative for fever.   Respiratory: Negative for shortness of breath.    Cardiovascular: Negative for chest pain and leg swelling.   Gastrointestinal: Negative for constipation and diarrhea.   Genitourinary: Negative for difficulty urinating, flank pain and hematuria.   Musculoskeletal: Positive for arthralgias and back pain.   Neurological: Negative for weakness and headaches.       Objective:      Physical Exam   Constitutional: He is oriented to person, place, and time. He appears well-developed and well-nourished. No distress.   Neck: No JVD present.   Cardiovascular: S1 normal and S2 normal.  Exam reveals no friction rub.    Pulmonary/Chest: Breath sounds normal. He has no wheezes. He has no rales.   Abdominal: Soft.   Musculoskeletal: He exhibits no edema.   Neurological: He is alert and oriented to person, place, and time.   Skin: Skin is warm and dry.   Psychiatric: He has a normal mood and affect.   Vitals reviewed.      Assessment:       1. 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 NSAID long-term use    3. Calcium oxalate kidney stones        Plan:             CKD Stage 4 with remarkably stable kidney function in the setting of unilateral hydronephrosis. Left kidney likely contributes more to total GFR     Pain--no NSAID use      RTC 4 months with labs prior

## 2017-04-11 ENCOUNTER — ANESTHESIA (OUTPATIENT)
Dept: SURGERY | Facility: HOSPITAL | Age: 82
End: 2017-04-11
Payer: MEDICARE

## 2017-04-11 ENCOUNTER — HOSPITAL ENCOUNTER (OUTPATIENT)
Facility: HOSPITAL | Age: 82
Discharge: HOME | End: 2017-04-11
Attending: UROLOGY | Admitting: UROLOGY
Payer: MEDICARE

## 2017-04-11 VITALS
HEIGHT: 74 IN | DIASTOLIC BLOOD PRESSURE: 79 MMHG | HEART RATE: 55 BPM | TEMPERATURE: 97 F | BODY MASS INDEX: 21.82 KG/M2 | OXYGEN SATURATION: 98 % | RESPIRATION RATE: 18 BRPM | SYSTOLIC BLOOD PRESSURE: 126 MMHG | WEIGHT: 170 LBS

## 2017-04-11 DIAGNOSIS — N20.1 RIGHT URETERAL STONE: Primary | ICD-10-CM

## 2017-04-11 PROCEDURE — 63600175 PHARM REV CODE 636 W HCPCS: Mod: PO | Performed by: NURSE ANESTHETIST, CERTIFIED REGISTERED

## 2017-04-11 PROCEDURE — 63600175 PHARM REV CODE 636 W HCPCS: Mod: PO | Performed by: ANESTHESIOLOGY

## 2017-04-11 PROCEDURE — 36000704 HC OR TIME LEV I 1ST 15 MIN: Mod: PO | Performed by: UROLOGY

## 2017-04-11 PROCEDURE — 37000009 HC ANESTHESIA EA ADD 15 MINS: Mod: PO | Performed by: UROLOGY

## 2017-04-11 PROCEDURE — 71000033 HC RECOVERY, INTIAL HOUR: Mod: PO | Performed by: UROLOGY

## 2017-04-11 PROCEDURE — 25000003 PHARM REV CODE 250: Mod: PO | Performed by: UROLOGY

## 2017-04-11 PROCEDURE — D9220A PRA ANESTHESIA: Mod: CRNA,,, | Performed by: NURSE ANESTHETIST, CERTIFIED REGISTERED

## 2017-04-11 PROCEDURE — 36000705 HC OR TIME LEV I EA ADD 15 MIN: Mod: PO | Performed by: UROLOGY

## 2017-04-11 PROCEDURE — 25000003 PHARM REV CODE 250: Mod: PO | Performed by: ANESTHESIOLOGY

## 2017-04-11 PROCEDURE — D9220A PRA ANESTHESIA: Mod: ANES,,, | Performed by: ANESTHESIOLOGY

## 2017-04-11 PROCEDURE — 37000008 HC ANESTHESIA 1ST 15 MINUTES: Mod: PO | Performed by: UROLOGY

## 2017-04-11 PROCEDURE — 25000003 PHARM REV CODE 250: Mod: PO | Performed by: NURSE ANESTHETIST, CERTIFIED REGISTERED

## 2017-04-11 PROCEDURE — 27200651 HC AIRWAY, LMA: Mod: PO | Performed by: NURSE ANESTHETIST, CERTIFIED REGISTERED

## 2017-04-11 PROCEDURE — 50590 FRAGMENTING OF KIDNEY STONE: CPT | Mod: RT,,, | Performed by: UROLOGY

## 2017-04-11 PROCEDURE — 63600175 PHARM REV CODE 636 W HCPCS: Mod: PO | Performed by: UROLOGY

## 2017-04-11 RX ORDER — LIDOCAINE HYDROCHLORIDE 10 MG/ML
1 INJECTION, SOLUTION EPIDURAL; INFILTRATION; INTRACAUDAL; PERINEURAL ONCE
Status: DISCONTINUED | OUTPATIENT
Start: 2017-04-11 | End: 2017-04-11 | Stop reason: HOSPADM

## 2017-04-11 RX ORDER — LIDOCAINE HCL/PF 100 MG/5ML
SYRINGE (ML) INTRAVENOUS
Status: DISCONTINUED | OUTPATIENT
Start: 2017-04-11 | End: 2017-04-11

## 2017-04-11 RX ORDER — GLYCOPYRROLATE 0.2 MG/ML
INJECTION INTRAMUSCULAR; INTRAVENOUS
Status: DISCONTINUED | OUTPATIENT
Start: 2017-04-11 | End: 2017-04-11

## 2017-04-11 RX ORDER — PROPOFOL 10 MG/ML
VIAL (ML) INTRAVENOUS
Status: DISCONTINUED | OUTPATIENT
Start: 2017-04-11 | End: 2017-04-11

## 2017-04-11 RX ORDER — HYDROCODONE BITARTRATE AND ACETAMINOPHEN 7.5; 325 MG/1; MG/1
1 TABLET ORAL EVERY 6 HOURS PRN
Qty: 30 TABLET | Refills: 0 | Status: SHIPPED | OUTPATIENT
Start: 2017-04-11 | End: 2017-08-09

## 2017-04-11 RX ORDER — OXYCODONE HYDROCHLORIDE 5 MG/1
5 TABLET ORAL ONCE AS NEEDED
Status: DISCONTINUED | OUTPATIENT
Start: 2017-04-11 | End: 2017-04-11 | Stop reason: HOSPADM

## 2017-04-11 RX ORDER — SODIUM CHLORIDE 9 MG/ML
INJECTION, SOLUTION INTRAVENOUS CONTINUOUS
Status: DISCONTINUED | OUTPATIENT
Start: 2017-04-11 | End: 2017-04-11 | Stop reason: HOSPADM

## 2017-04-11 RX ORDER — FENTANYL CITRATE 50 UG/ML
INJECTION, SOLUTION INTRAMUSCULAR; INTRAVENOUS
Status: DISCONTINUED | OUTPATIENT
Start: 2017-04-11 | End: 2017-04-11

## 2017-04-11 RX ORDER — SUCCINYLCHOLINE CHLORIDE 20 MG/ML
INJECTION INTRAMUSCULAR; INTRAVENOUS
Status: DISCONTINUED | OUTPATIENT
Start: 2017-04-11 | End: 2017-04-11

## 2017-04-11 RX ORDER — SODIUM CHLORIDE, SODIUM LACTATE, POTASSIUM CHLORIDE, CALCIUM CHLORIDE 600; 310; 30; 20 MG/100ML; MG/100ML; MG/100ML; MG/100ML
INJECTION, SOLUTION INTRAVENOUS CONTINUOUS
Status: DISCONTINUED | OUTPATIENT
Start: 2017-04-11 | End: 2017-04-11 | Stop reason: HOSPADM

## 2017-04-11 RX ORDER — DEXAMETHASONE SODIUM PHOSPHATE 4 MG/ML
8 INJECTION, SOLUTION INTRA-ARTICULAR; INTRALESIONAL; INTRAMUSCULAR; INTRAVENOUS; SOFT TISSUE
Status: COMPLETED | OUTPATIENT
Start: 2017-04-11 | End: 2017-04-11

## 2017-04-11 RX ORDER — FENTANYL CITRATE 50 UG/ML
25 INJECTION, SOLUTION INTRAMUSCULAR; INTRAVENOUS EVERY 5 MIN PRN
Status: DISCONTINUED | OUTPATIENT
Start: 2017-04-11 | End: 2017-04-11 | Stop reason: HOSPADM

## 2017-04-11 RX ORDER — ONDANSETRON 2 MG/ML
INJECTION INTRAMUSCULAR; INTRAVENOUS
Status: DISCONTINUED | OUTPATIENT
Start: 2017-04-11 | End: 2017-04-11

## 2017-04-11 RX ORDER — ONDANSETRON 2 MG/ML
4 INJECTION INTRAMUSCULAR; INTRAVENOUS ONCE
Status: DISCONTINUED | OUTPATIENT
Start: 2017-04-11 | End: 2017-04-11 | Stop reason: HOSPADM

## 2017-04-11 RX ADMIN — DEXTROSE 2 G: 50 INJECTION, SOLUTION INTRAVENOUS at 09:04

## 2017-04-11 RX ADMIN — ONDANSETRON 4 MG: 2 INJECTION, SOLUTION INTRAMUSCULAR; INTRAVENOUS at 09:04

## 2017-04-11 RX ADMIN — EPHEDRINE SULFATE 20 MG: 50 INJECTION, SOLUTION INTRAMUSCULAR; INTRAVENOUS; SUBCUTANEOUS at 10:04

## 2017-04-11 RX ADMIN — PROPOFOL 100 MG: 10 INJECTION, EMULSION INTRAVENOUS at 09:04

## 2017-04-11 RX ADMIN — GLYCOPYRROLATE 0.2 MG: 0.2 INJECTION, SOLUTION INTRAMUSCULAR; INTRAVENOUS at 09:04

## 2017-04-11 RX ADMIN — FENTANYL CITRATE 50 MCG: 50 INJECTION, SOLUTION INTRAMUSCULAR; INTRAVENOUS at 09:04

## 2017-04-11 RX ADMIN — SODIUM CHLORIDE, SODIUM LACTATE, POTASSIUM CHLORIDE, AND CALCIUM CHLORIDE: .6; .31; .03; .02 INJECTION, SOLUTION INTRAVENOUS at 09:04

## 2017-04-11 RX ADMIN — LIDOCAINE HYDROCHLORIDE 75 SYRINGE: 20 INJECTION PARENTERAL at 09:04

## 2017-04-11 RX ADMIN — DEXAMETHASONE SODIUM PHOSPHATE 8 MG: 4 INJECTION, SOLUTION INTRAMUSCULAR; INTRAVENOUS at 09:04

## 2017-04-11 RX ADMIN — SUCCINYLCHOLINE CHLORIDE 20 MG: 20 INJECTION, SOLUTION INTRAMUSCULAR; INTRAVENOUS at 09:04

## 2017-04-11 NOTE — INTERVAL H&P NOTE
The patient has been examined and the H&P has been reviewed:    I concur with the findings and changes have been noted since the H&P was written: Obstructing right UPJ stone.  I recommend right ESWL    Anesthesia/Surgery risks, benefits and alternative options discussed and understood by patient/family.          There are no hospital problems to display for this patient.

## 2017-04-11 NOTE — IP AVS SNAPSHOT
[facility]  [address]  [place] [address]  Phone: [phone]           Patient Discharge Instructions   Our goal is to set you up for success. This packet includes information on your condition, medications, and your home care.  It will help you care for yourself to prevent having to return to the hospital.     Please ask your nurse if you have any questions.      There are many details to remember when preparing to leave the hospital. Here is what you will need to do:    1. Take your medicine. If you are prescribed medications, review your Medication List on the following pages. You may have new medications to  at the pharmacy and others that you'll need to stop taking. Review the instructions for how and when to take your medications. Talk with your doctor or nurses if you are unsure of what to do.     2. Go to your follow-up appointments. Specific follow-up information is listed in the following pages. Your may be contacted by a nurse or clinical provider about future appointments. Be sure we have all of the phone numbers to reach you. Please contact your provider's office if you are unable to make an appointment.     3. Watch for warning signs. Your doctor or nurse will give you detailed warning signs to watch for and when to call for assistance. These instructions may also include educational information about your condition. If you experience any of warning signs to your health, call your doctor.           [facility]  Unless otherwise directed by your provider, please   contact [facility], our nurse care line   that is available for 24/7 assistance.     [phone] (toll-free)     Registered nurses in the [facility]   provide: appointment scheduling, clinical advisement, health education, and other advisory services.                  ** Verify the list of medication(s) below is accurate and up to date. Carry this with you in case of  emergency. If your medications have changed, please notify your healthcare provider.             Medication List      START taking these medications        Additional Info                      hydrocodone-acetaminophen 7.5-325mg 7.5-325 mg per tablet   Commonly known as:  NORCO   Quantity:  30 tablet   Refills:  0   Dose:  1 tablet    Instructions:  Take 1 tablet by mouth every 6 (six) hours as needed for Pain.     Begin Date    AM    Noon    PM    Bedtime         CONTINUE taking these medications        Additional Info                      COMBIGAN 0.2-0.5 % Drop   Quantity:  10 mL   Refills:  0   Generic drug:  brimonidine-timolol    Instructions:  INSTILL 1 DROP INTO LEFT EYE TWICE DAILY     Begin Date    AM    Noon    PM    Bedtime       LUBRICANT EYE DROPS OPHT   Refills:  0    Instructions:  Apply to eye.     Begin Date    AM    Noon    PM    Bedtime       multivitamin capsule   Refills:  0   Dose:  1 capsule    Instructions:  Take 1 capsule by mouth once daily.     Begin Date    AM    Noon    PM    Bedtime       tamsulosin 0.4 mg Cp24   Commonly known as:  FLOMAX   Quantity:  30 capsule   Refills:  11   Dose:  0.4 mg    Instructions:  Take 1 capsule (0.4 mg total) by mouth once daily.     Begin Date    AM    Noon    PM    Bedtime            Where to Get Your Medications      These medications were sent to [facility] [address] [place] [address] AT [facility] & [address] [address]     Phone:  [phone]     hydrocodone-acetaminophen 7.5-325mg 7.5-325 mg per tablet                  Please bring to all follow up appointments:    1. A copy of your discharge instructions.  2. All medicines you are currently taking in their original bottles.  3. Identification and insurance card.    Please arrive 15 minutes ahead of scheduled appointment time.    Please call 24 hours in advance if you must reschedule your appointment and/or time.        Your Scheduled  Appointments     May 24, 2017  2:00 PM CDT   Established Patient Visit with RHEA Ramos MD   [place] [facility] ([facility])    [address]  [place] [address]   [phone]            Jun 28, 2017  1:45 PM CDT   Established Patient Visit with Coleen Pizarro MD   [place] [facility] ([facility])    [address]  [place] [address]   [phone]            Aug 02, 2017 10:40 AM CDT   Non-Fasting Lab with [facility], [place] [facility] ([facility])    [address]  [place] [address]   [phone]            Aug 09, 2017  2:00 PM CDT   Established Patient Visit with Jass Dominguez MD   [place] [facility] ([facility])    [address]  [facility] [address]   [phone]                Discharge Instructions     Future Orders    Activity as tolerated     Scheduling Instructions:    Keep hydrated  Strain urine    Call MD for:  persistent nausea and vomiting or diarrhea     Call MD for:  severe uncontrolled pain     Call MD for:  temperature >100.4     Diet general     Questions:    Total calories:      Fat restriction, if any:      Protein restriction, if any:      Na restriction, if any:      Fluid restriction:      Additional restrictions:          Discharge Instructions         LITHOTRIPSY      DOS:   Minimal activity for 24 hours.   May shower or tub bathe today   Advance diet as tolerated.   Drink a lot of liquids until you see your doctor.   Strain all urine. Collect fragments/stones in the container provided. Bring to your follow up appointment.   Resume home medications as prescribed    DONT:   No driving for 24 hours or while taking narcotic pain medication   DO NOT TAKE ADDITIONAL TYLENOL/ACETAMINOPHEN WHILE TAKING NARCOTIC PAIN MEDICATION THAT CONTAINS TYLENOL/ACETAMINOPHEN.    CALL PHYSICIAN FOR:   Unable to urinate within 6 hours after surgery.   Fever>101   Persistent pain not  "relieved by pain medication   Bloody urine with significant clots.    Contact your physician for emergencies at [phone]    Make return appointment for 2 weeks t [phone]          Admission Information     Date & Time Provider Department CSN    4/11/2017  8:11 AM RHEA Ramos MD [facility] [id number]      Care Providers     Provider Role Specialty Primary office phone    RHEA Ramso MD Attending Provider Urology [phone]    RHEA Ramos MD Surgeon  Urology [phone]      Your Vitals Were     BP Pulse Temp Resp Height Weight    117/79 50 97.1 °F (36.2 °C) (Skin) 18 6' 2" (1.88 m) 77.1 kg (170 lb)    SpO2 BMI             98% 21.83 kg/m2         Recent Lab Values     No lab values to display.      Allergies as of 4/11/2017     No Known Allergies      Advance Directives     An advance directive is a document which, in the event you are no longer able to make decisions for yourself, tells your healthcare team what kind of treatment you do or do not want to receive, or who you would like to make those decisions for you.  If you do not currently have an advance directive, [facility] encourages you to create one.  For more information call:  [phone]), [phone] ([phone]),  or log on to [email or web address].        Language Assistance Services     ATTENTION: Language assistance services are available, free of charge. Please call [phone].      ATENCIÓN: Si habla español, tiene a masters disposición servicios gratuitos de asistencia lingüística. Llame al [phone].     CHÚ Ý: N?u b?n nói Ti?ng Vi?t, có các d?ch v? h? tr? ngôn ng? mi?n phí dành cho b?n. G?i s? [phone].         [facility] complies with applicable Federal civil rights laws and does not discriminate on the basis of race, color, national origin, age, disability, or sex.        "

## 2017-04-11 NOTE — OP NOTE
DATE OF PROCEDURE:  04/11/2017.    PREOPERATIVE DIAGNOSIS:  Right ureteral stone.    POSTOPERATIVE DIAGNOSIS:  Right ureteral stone.    OPERATIVE PROCEDURE:  Right extracorporeal shockwave lithotripsy.    ANESTHESIA:  General.    COMPLICATIONS:  None.    SPECIMEN:  None.    ESTIMATED BLOOD LOSS:  None.    SURGEON:  Elijah Ramos M.D.    ASSISTANT:  None.    INDICATIONS FOR PROCEDURE:  Mr. Jarquin is an 81-year-old male with an   obstructing right proximal ureteral stone.  We discussed treatment options and I   recommended shockwave lithotripsy.    PROCEDURE IN DETAIL:  After informed consent was obtained, he was brought to the   Operating Room.  He was given preoperative antibiotics.  He was placed supine   on the lithotriptor table.  After adequate general anesthesia was achieved, we   used fluoroscopy and were able to visualize the stone in the proximal right   ureter.  We focused the shockwaves on the stone using fluoroscopy in multiple   planes.  I then delivered a total of 3000 shocks at 26 kilovolts.  There was   moderate fragmentation of the stone on subsequent fluoroscopic images.  He   otherwise tolerated the procedure well.  There were no complications.  He was   awakened and transported to the PACU in stable condition.      LELO  dd: 04/11/2017 10:34:57 (CDT)  td: 04/11/2017 12:13:14 (CDT)  Doc ID   #[id number]  Job ID #[id number]    CC:

## 2017-04-11 NOTE — ANESTHESIA PREPROCEDURE EVALUATION
04/11/2017  Farnaz Jarquin is a 81 y.o., male.    [facility] Anesthesia Evaluation    I have reviewed the Patient Summary Reports.    I have reviewed the Nursing Notes.      Review of Systems  Anesthesia Hx:  No problems with previous Anesthesia    Renal/:   Chronic Renal Disease renal calculi        Physical Exam  General:  Well nourished                 Anesthesia Plan  Type of Anesthesia, risks & benefits discussed:  Anesthesia Type:  general  Patient's Preference:   Intra-op Monitoring Plan:   Intra-op Monitoring Plan Comments:   Post Op Pain Control Plan:   Post Op Pain Control Plan Comments:   Induction:   IV  Beta Blocker:  Patient is not currently on a Beta-Blocker (No further documentation required).       Informed Consent: Patient understands risks and agrees with Anesthesia plan.  Questions answered. Anesthesia consent signed with patient.  ASA Score: 2     Day of Surgery Review of History & Physical:    H&P update referred to the surgeon.         Ready For Surgery From Anesthesia Perspective.

## 2017-04-11 NOTE — DISCHARGE INSTRUCTIONS
LITHOTRIPSY      DOS:   Minimal activity for 24 hours.   May shower or tub bathe today   Advance diet as tolerated.   Drink a lot of liquids until you see your doctor.   Strain all urine. Collect fragments/stones in the container provided. Bring to your follow up appointment.   Resume home medications as prescribed    DONT:   No driving for 24 hours or while taking narcotic pain medication   DO NOT TAKE ADDITIONAL TYLENOL/ACETAMINOPHEN WHILE TAKING NARCOTIC PAIN MEDICATION THAT CONTAINS TYLENOL/ACETAMINOPHEN.    CALL PHYSICIAN FOR:   Unable to urinate within 6 hours after surgery.   Fever>101   Persistent pain not relieved by pain medication   Bloody urine with significant clots.    Contact your physician for emergencies at [phone]    Make return appointment for 2 weeks t [phone]

## 2017-04-11 NOTE — OP NOTE
[facility]  Surgery Department  Operative Note    SUMMARY     Date of Procedure: 4/11/2017     Procedure: Procedure(s) (LRB):  LITHOTRIPSY-EXTRACORPOREAL SHOCK WAVE (Right)     Surgeon(s) and Role:     * RHEA Ramos MD - Primary    Assisting Surgeon: None    Pre-Operative Diagnosis: Ureteral stone [N20.1]    Post-Operative Diagnosis: Post-Op Diagnosis Codes:     * Ureteral stone [N20.1]    Anesthesia: General    Technical Procedures Used: Shock wave lithotripsy    Description of the Findings of the Procedure: 1 cm prox. Right ureteral stone    Significant Surgical Tasks Conducted by the Assistant(s), if Applicable: n/a    Complications: No    Estimated Blood Loss (EBL): * No values recorded between 4/11/2017 10:00 AM and 4/11/2017 10:31 AM *           Implants: * No implants in log *    Specimens:   Specimen     None                  Condition: Stable    Disposition: PACU - hemodynamically stable.    Attestation: I performed the procedure.

## 2017-04-11 NOTE — ANESTHESIA POSTPROCEDURE EVALUATION
"Anesthesia Post Evaluation    Patient: Farnaz Jarquin    Procedure(s) Performed: Procedure(s) (LRB):  LITHOTRIPSY-EXTRACORPOREAL SHOCK WAVE (Right)    Final Anesthesia Type: general  Patient location during evaluation: PACU  Patient participation: Yes- Able to Participate  Level of consciousness: awake and alert  Post-procedure vital signs: reviewed and stable  Pain management: adequate  Airway patency: patent  PONV status at discharge: No PONV  Anesthetic complications: no      Cardiovascular status: blood pressure returned to baseline and hemodynamically stable  Respiratory status: unassisted  Hydration status: euvolemic  Follow-up not needed.        Visit Vitals    /79    Pulse (!) 55    Temp 36.2 °C (97.1 °F) (Skin)    Resp 18    Ht 6' 2" (1.88 m)    Wt 77.1 kg (170 lb)    SpO2 98%    BMI 21.83 kg/m2       Pain/Althea Score: Pain Assessment Performed: Yes (4/11/2017 10:47 AM)  Presence of Pain: denies (4/11/2017 11:14 AM)  Althea Score: 10 (4/11/2017 10:47 AM)      "

## 2017-04-11 NOTE — TRANSFER OF CARE
"Anesthesia Transfer of Care Note    Patient: Farnaz Jarquin    Procedure(s) Performed: Procedure(s) (LRB):  LITHOTRIPSY-EXTRACORPOREAL SHOCK WAVE (Right)    Patient location: PACU    Anesthesia Type: general    Transport from OR: Transported from OR on room air with adequate spontaneous ventilation    Post pain: adequate analgesia    Post assessment: no apparent anesthetic complications and tolerated procedure well    Post vital signs: stable    Level of consciousness: awake    Nausea/Vomiting: no nausea/vomiting    Complications: none          Last vitals:   Visit Vitals    /73 (BP Location: Right arm, Patient Position: Lying, BP Method: Automatic)    Pulse (!) 45    Temp 36.2 °C (97.2 °F) (Skin)    Resp 16    Ht 6' 2" (1.88 m)    Wt 77.1 kg (170 lb)    SpO2 95%    BMI 21.83 kg/m2     "

## 2017-06-28 ENCOUNTER — OFFICE VISIT (OUTPATIENT)
Dept: DERMATOLOGY | Facility: CLINIC | Age: 82
End: 2017-06-28
Payer: MEDICARE

## 2017-06-28 VITALS — HEIGHT: 74 IN | BODY MASS INDEX: 20.53 KG/M2 | WEIGHT: 160 LBS

## 2017-06-28 DIAGNOSIS — L57.0 ACTINIC KERATOSIS: ICD-10-CM

## 2017-06-28 DIAGNOSIS — M72.2 PLANTAR FASCIITIS: ICD-10-CM

## 2017-06-28 DIAGNOSIS — L29.9 SCALP PRURITUS: Primary | ICD-10-CM

## 2017-06-28 DIAGNOSIS — Z12.83 SKIN CANCER SCREENING: ICD-10-CM

## 2017-06-28 PROCEDURE — 1159F MED LIST DOCD IN RCRD: CPT | Mod: ,,, | Performed by: DERMATOLOGY

## 2017-06-28 PROCEDURE — 99212 OFFICE O/P EST SF 10 MIN: CPT | Mod: PBBFAC,PO,25 | Performed by: DERMATOLOGY

## 2017-06-28 PROCEDURE — 17000 DESTRUCT PREMALG LESION: CPT | Mod: PBBFAC,PO | Performed by: DERMATOLOGY

## 2017-06-28 PROCEDURE — 99214 OFFICE O/P EST MOD 30 MIN: CPT | Mod: 25,S$PBB,, | Performed by: DERMATOLOGY

## 2017-06-28 PROCEDURE — 17000 DESTRUCT PREMALG LESION: CPT | Mod: S$PBB,,, | Performed by: DERMATOLOGY

## 2017-06-28 PROCEDURE — 99999 PR PBB SHADOW E&M-EST. PATIENT-LVL II: CPT | Mod: PBBFAC,,, | Performed by: DERMATOLOGY

## 2017-06-28 PROCEDURE — 1125F AMNT PAIN NOTED PAIN PRSNT: CPT | Mod: ,,, | Performed by: DERMATOLOGY

## 2017-06-28 RX ORDER — KETOCONAZOLE 20 MG/ML
SHAMPOO, SUSPENSION TOPICAL
Qty: 120 ML | Refills: 5 | Status: SHIPPED | OUTPATIENT
Start: 2017-06-28 | End: 2017-08-09

## 2017-06-28 NOTE — PROGRESS NOTES
Subjective:       Patient ID:  Farnaz Jarquin is a 81 y.o. male who presents for   Chief Complaint   Patient presents with    Skin Check    Lesion     Pt here for 6 month follow up skin check. Last seen on 1-3-2017   tenderness of left heel for 1 week,not treated. Worse when gets out of bed in AM. Not currently flared.   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        Objective:    Physical Exam   Constitutional: He appears well-developed and well-nourished. No distress.   HENT:   Head:       Mouth/Throat: Lips normal.    Eyes: Lids are normal.  No conjunctival no injection.   Cardiovascular: There is no local extremity swelling and no dependent edema.     Neurological: He is alert and oriented to person, place, and time. He is not disoriented.   Psychiatric: He has a normal mood and affect.   Skin:   Areas Examined (abnormalities noted in diagram):   Scalp / Hair Palpated and Inspected  Head / Face Inspection Performed  Neck Inspection Performed  Chest / Axilla Inspection Performed  Abdomen Inspection Performed  Back Inspection Performed  RUE Inspected  LUE Inspection Performed  LLE Inspection Performed                   Diagram Legend     Erythematous scaling macule/papule c/w actinic keratosis       Vascular papule c/w angioma      Pigmented verrucoid papule/plaque c/w seborrheic keratosis      Yellow umbilicated papule c/w sebaceous hyperplasia      Irregularly shaped tan macule c/w lentigo     1-2 mm smooth white papules consistent with Milia      Movable subcutaneous cyst with punctum c/w epidermal inclusion cyst      Subcutaneous movable cyst c/w pilar cyst      Firm pink to brown  papule c/w dermatofibroma      Pedunculated fleshy papule(s) c/w skin tag(s)      Evenly pigmented macule c/w junctional nevus     Mildly variegated pigmented, slightly irregular-bordered macule c/w mildly atypical nevus      Flesh colored to evenly pigmented papule c/w intradermal nevus       Pink pearly papule/plaque c/w basal cell carcinoma      Erythematous hyperkeratotic cursted plaque c/w SCC      Surgical scar with no sign of skin cancer recurrence      Open and closed comedones      Inflammatory papules and pustules      Verrucoid papule consistent consistent with wart     Erythematous eczematous patches and plaques     Dystrophic onycholytic nail with subungual debris c/w onychomycosis     Umbilicated papule    Erythematous-base heme-crusted tan verrucoid plaque consistent with inflamed seborrheic keratosis     Erythematous Silvery Scaling Plaque c/w Psoriasis     See annotation      Assessment / Plan:        Scalp pruritus  -     ketoconazole (NIZORAL) 2 % shampoo; Wash hair with medicated shampoo at least 2x/week - let sit on scalp at least 5 minutes prior to rinsing  Dispense: 120 mL; Refill: 5    Actinic keratosis  Cryosurgery Procedure Note    Verbal consent from the patient is obtained and the patient is aware of the precancerous quality and need for treatment of these lesions. Liquid nitrogen cryosurgery is applied to the 1 actinic keratoses, as detailed in the physical exam, to produce a freeze injury. The patient is aware that blisters may form and is instructed on wound care with gentle cleansing and use of vaseline ointment to keep moist until healed. The patient is supplied a handout on cryosurgery and is instructed to call if lesions do not completely resolve. Discussed risk postinflammatory pigmentary changes.       Skin cancer screening  Upper body skin examination performed today including at least 6 points as noted in physical examination. No lesions suspicious for malignancy  noted.        Plantar fasciitis vs other, left heel  rec f/u with podiatry  No cutaneous findings today to explain pain             Return in about 6 months (around 12/28/2017).

## 2017-08-02 ENCOUNTER — LAB VISIT (OUTPATIENT)
Dept: LAB | Facility: HOSPITAL | Age: 82
End: 2017-08-02
Attending: INTERNAL MEDICINE
Payer: MEDICARE

## 2017-08-02 DIAGNOSIS — N18.4 STAGE 4 CHRONIC KIDNEY DISEASE: ICD-10-CM

## 2017-08-02 LAB
ALBUMIN SERPL BCP-MCNC: 3.4 G/DL
ANION GAP SERPL CALC-SCNC: 7 MMOL/L
BUN SERPL-MCNC: 28 MG/DL
CALCIUM SERPL-MCNC: 9.6 MG/DL
CHLORIDE SERPL-SCNC: 107 MMOL/L
CO2 SERPL-SCNC: 27 MMOL/L
CREAT SERPL-MCNC: 1.9 MG/DL
EST. GFR  (AFRICAN AMERICAN): 37.7 ML/MIN/1.73 M^2
EST. GFR  (NON AFRICAN AMERICAN): 32.6 ML/MIN/1.73 M^2
GLUCOSE SERPL-MCNC: 98 MG/DL
PHOSPHATE SERPL-MCNC: 3.8 MG/DL
POTASSIUM SERPL-SCNC: 5.6 MMOL/L
SODIUM SERPL-SCNC: 141 MMOL/L

## 2017-08-02 PROCEDURE — 80069 RENAL FUNCTION PANEL: CPT

## 2017-08-02 PROCEDURE — 36415 COLL VENOUS BLD VENIPUNCTURE: CPT | Mod: PO

## 2017-08-03 RX ORDER — BRIMONIDINE TARTRATE AND TIMOLOL MALEATE 2; 5 MG/ML; MG/ML
SOLUTION OPHTHALMIC
Qty: 10 ML | Refills: 11 | Status: SHIPPED | OUTPATIENT
Start: 2017-08-03 | End: 2018-03-19 | Stop reason: SDUPTHER

## 2017-08-09 ENCOUNTER — OFFICE VISIT (OUTPATIENT)
Dept: NEPHROLOGY | Facility: CLINIC | Age: 82
End: 2017-08-09
Payer: MEDICARE

## 2017-08-09 VITALS
OXYGEN SATURATION: 98 % | HEIGHT: 74 IN | BODY MASS INDEX: 21.67 KG/M2 | SYSTOLIC BLOOD PRESSURE: 120 MMHG | TEMPERATURE: 98 F | DIASTOLIC BLOOD PRESSURE: 82 MMHG | HEART RATE: 62 BPM | WEIGHT: 168.88 LBS

## 2017-08-09 DIAGNOSIS — N18.4 CKD (CHRONIC KIDNEY DISEASE) STAGE 4, GFR 15-29 ML/MIN: ICD-10-CM

## 2017-08-09 DIAGNOSIS — E87.5 HYPERKALEMIA: Primary | ICD-10-CM

## 2017-08-09 DIAGNOSIS — N20.0 CALCIUM OXALATE KIDNEY STONES: ICD-10-CM

## 2017-08-09 DIAGNOSIS — N18.30 CKD (CHRONIC KIDNEY DISEASE) STAGE 3, GFR 30-59 ML/MIN: ICD-10-CM

## 2017-08-09 PROCEDURE — 1126F AMNT PAIN NOTED NONE PRSNT: CPT | Mod: ,,, | Performed by: INTERNAL MEDICINE

## 2017-08-09 PROCEDURE — 1159F MED LIST DOCD IN RCRD: CPT | Mod: ,,, | Performed by: INTERNAL MEDICINE

## 2017-08-09 PROCEDURE — 99213 OFFICE O/P EST LOW 20 MIN: CPT | Mod: PBBFAC,PO | Performed by: INTERNAL MEDICINE

## 2017-08-09 PROCEDURE — 3079F DIAST BP 80-89 MM HG: CPT | Mod: ,,, | Performed by: INTERNAL MEDICINE

## 2017-08-09 PROCEDURE — 99999 PR PBB SHADOW E&M-EST. PATIENT-LVL III: CPT | Mod: PBBFAC,,, | Performed by: INTERNAL MEDICINE

## 2017-08-09 PROCEDURE — 99214 OFFICE O/P EST MOD 30 MIN: CPT | Mod: S$PBB,,, | Performed by: INTERNAL MEDICINE

## 2017-08-09 PROCEDURE — 3074F SYST BP LT 130 MM HG: CPT | Mod: ,,, | Performed by: INTERNAL MEDICINE

## 2017-08-21 NOTE — PROGRESS NOTES
Subjective:       Patient ID: Farnaz Jarquin is a 81 y.o. White male who presents for follow-up evaluation of Chronic Kidney Disease    HPI  Review of Systems    Objective:      Physical Exam    Assessment:       1. Hyperkalemia    2. CKD (chronic kidney disease) stage 3, GFR 30-59 ml/min    3. 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 Calcium oxalate kidney stones        Plan:             CKD is stable    Hyperkalemia--discussed. Low potassium diet, will repeat chem

## 2017-08-23 ENCOUNTER — LAB VISIT (OUTPATIENT)
Dept: LAB | Facility: HOSPITAL | Age: 82
End: 2017-08-23
Attending: INTERNAL MEDICINE
Payer: MEDICARE

## 2017-08-23 DIAGNOSIS — E87.5 HYPERKALEMIA: ICD-10-CM

## 2017-08-23 LAB
ANION GAP SERPL CALC-SCNC: 8 MMOL/L
BUN SERPL-MCNC: 24 MG/DL
CALCIUM SERPL-MCNC: 9.6 MG/DL
CHLORIDE SERPL-SCNC: 107 MMOL/L
CO2 SERPL-SCNC: 26 MMOL/L
CREAT SERPL-MCNC: 1.8 MG/DL
EST. GFR  (AFRICAN AMERICAN): 40.2 ML/MIN/1.73 M^2
EST. GFR  (NON AFRICAN AMERICAN): 34.8 ML/MIN/1.73 M^2
GLUCOSE SERPL-MCNC: 78 MG/DL
POTASSIUM SERPL-SCNC: 4.8 MMOL/L
SODIUM SERPL-SCNC: 141 MMOL/L

## 2017-08-23 PROCEDURE — 80048 BASIC METABOLIC PNL TOTAL CA: CPT

## 2017-08-23 PROCEDURE — 36415 COLL VENOUS BLD VENIPUNCTURE: CPT | Mod: PO

## 2017-09-01 ENCOUNTER — HOSPITAL ENCOUNTER (OUTPATIENT)
Dept: RADIOLOGY | Facility: HOSPITAL | Age: 82
Discharge: HOME | End: 2017-09-01
Attending: UROLOGY
Payer: MEDICARE

## 2017-09-01 ENCOUNTER — OFFICE VISIT (OUTPATIENT)
Dept: UROLOGY | Facility: CLINIC | Age: 82
End: 2017-09-01
Payer: MEDICARE

## 2017-09-01 VITALS
HEIGHT: 74 IN | WEIGHT: 169.56 LBS | SYSTOLIC BLOOD PRESSURE: 124 MMHG | RESPIRATION RATE: 17 BRPM | BODY MASS INDEX: 21.76 KG/M2 | HEART RATE: 59 BPM | DIASTOLIC BLOOD PRESSURE: 79 MMHG

## 2017-09-01 DIAGNOSIS — N40.1 HYPERTROPHY OF PROSTATE WITH URINARY OBSTRUCTION AND OTHER LOWER URINARY TRACT SYMPTOMS (LUTS): ICD-10-CM

## 2017-09-01 DIAGNOSIS — N20.0 KIDNEY STONE: ICD-10-CM

## 2017-09-01 DIAGNOSIS — N30.90 CYSTITIS, UNSPECIFIED: Primary | ICD-10-CM

## 2017-09-01 DIAGNOSIS — N13.8 HYPERTROPHY OF PROSTATE WITH URINARY OBSTRUCTION AND OTHER LOWER URINARY TRACT SYMPTOMS (LUTS): ICD-10-CM

## 2017-09-01 LAB
BILIRUB SERPL-MCNC: ABNORMAL MG/DL
BLOOD URINE, POC: ABNORMAL
COLOR, POC UA: YELLOW
GLUCOSE UR QL STRIP: ABNORMAL
KETONES UR QL STRIP: ABNORMAL
LEUKOCYTE ESTERASE URINE, POC: ABNORMAL
NITRITE, POC UA: ABNORMAL
PH, POC UA: 5
PROTEIN, POC: ABNORMAL
SPECIFIC GRAVITY, POC UA: 1.01
UROBILINOGEN, POC UA: ABNORMAL

## 2017-09-01 PROCEDURE — 3078F DIAST BP <80 MM HG: CPT | Mod: ,,, | Performed by: UROLOGY

## 2017-09-01 PROCEDURE — 99999 PR PBB SHADOW E&M-EST. PATIENT-LVL III: CPT | Mod: PBBFAC,,, | Performed by: UROLOGY

## 2017-09-01 PROCEDURE — 3074F SYST BP LT 130 MM HG: CPT | Mod: ,,, | Performed by: UROLOGY

## 2017-09-01 PROCEDURE — 99214 OFFICE O/P EST MOD 30 MIN: CPT | Mod: S$PBB,,, | Performed by: UROLOGY

## 2017-09-01 PROCEDURE — 1159F MED LIST DOCD IN RCRD: CPT | Mod: ,,, | Performed by: UROLOGY

## 2017-09-01 PROCEDURE — 74000 XR ABDOMEN AP 1 VIEW: CPT | Mod: 26,,, | Performed by: RADIOLOGY

## 2017-09-01 PROCEDURE — 99213 OFFICE O/P EST LOW 20 MIN: CPT | Mod: PBBFAC,25,PO | Performed by: UROLOGY

## 2017-09-01 PROCEDURE — 1126F AMNT PAIN NOTED NONE PRSNT: CPT | Mod: ,,, | Performed by: UROLOGY

## 2017-09-01 PROCEDURE — 81002 URINALYSIS NONAUTO W/O SCOPE: CPT | Mod: PBBFAC,PO | Performed by: UROLOGY

## 2017-09-01 PROCEDURE — 74000 XR ABDOMEN AP 1 VIEW: CPT | Mod: TC,PO

## 2017-09-01 RX ORDER — TAMSULOSIN HYDROCHLORIDE 0.4 MG/1
0.4 CAPSULE ORAL DAILY
Qty: 30 CAPSULE | Refills: 11 | Status: SHIPPED | OUTPATIENT
Start: 2017-09-01 | End: 2018-03-19 | Stop reason: ALTCHOICE

## 2017-09-01 NOTE — PROGRESS NOTES
Subjective:       Patient ID: Farnaz Jarquin is a 81 y.o. male.    Chief Complaint: Follow-up    HPI  Review of Systems    Objective:      Physical Exam    Assessment:       1. Cystitis, unspecified        Plan:       Cystitis, unspecified  -     POCT URINE DIPSTICK WITHOUT MICROSCOPE

## 2017-09-01 NOTE — PROGRESS NOTES
Subjective:       Patient ID: Farnaz Jarquin is a 81 y.o. male.    Chief Complaint: Follow-up    HPI     81 year old s/p right ESWL for obstructing right proximal ureteral stone.  He was not seen post-op.  He passed 2 small fragments.  He has no flank pain.  No repeat imaging after procedure.  He had lend inguinal hernia repair since last seen and now he says the left testes is retracted into groin.  It causes no pain.  He denies hematuria and dysuria but complains of worsening obstructive symptoms.  He previous was taking flomax but stopped when Rx ran out.    Urine dipstick shows negative for nitrites, glucose, positive for trace leukocytes, trace blood, trace protein.    Review of Systems   Constitutional: Negative for fever.   Genitourinary: Negative for dysuria and hematuria.       Objective:      Physical Exam   Constitutional: He is oriented to person, place, and time. He appears well-developed and well-nourished.   HENT:   Head: Normocephalic and atraumatic.   Eyes: Conjunctivae are normal.   Cardiovascular: Normal rate.    Pulmonary/Chest: Effort normal.   Abdominal: Soft. There is no CVA tenderness. Hernia confirmed negative in the right inguinal area and confirmed negative in the left inguinal area.   Genitourinary: Testes normal and penis normal.         Musculoskeletal: Normal range of motion.   Neurological: He is alert and oriented to person, place, and time.   Skin: Skin is warm and dry. No rash noted.   Psychiatric: He has a normal mood and affect.   Vitals reviewed.      Assessment:       1. Cystitis, unspecified    2. Hypertrophy of prostate with urinary obstruction and other lower urinary tract symptoms (LUTS)    3. Kidney stone        Plan:       Cystitis, unspecified  -     POCT URINE DIPSTICK WITHOUT MICROSCOPE    Hypertrophy of prostate with urinary obstruction and other lower urinary tract symptoms (LUTS)    Kidney stone    Other orders  -     tamsulosin (FLOMAX) 0.4 mg Cp24; Take 1  capsule (0.4 mg total) by mouth once daily.  Dispense: 30 capsule; Refill: 11      Get KUB as previously ordered to ensure resolution of stone.  Resume Tamsulsin.  Testes retracted due to scar scarring from hernia repair.  We dicussed orchidopexy but he is not bothered and will observe for now.

## 2017-09-05 ENCOUNTER — TELEPHONE (OUTPATIENT)
Dept: UROLOGY | Facility: CLINIC | Age: 82
End: 2017-09-05

## 2017-09-05 NOTE — TELEPHONE ENCOUNTER
Patient informed of KUB results. He is asking if he should schedule a procedure for his stone or wait it out with Flomax? Please advise.

## 2017-09-05 NOTE — TELEPHONE ENCOUNTER
Stone is large in lower pole.  He will not pass.  Continue Flomax,  Ok to schedule Left ESWL for the stone if he wants.

## 2017-09-05 NOTE — TELEPHONE ENCOUNTER
----- Message from RHEA Ramos MD sent at 9/1/2017  6:04 PM CDT -----  Call patient.  I reviewed the KUB.  The right ureteral stone is not seen.  Left lower pole stone is unchanged

## 2017-09-06 NOTE — DISCHARGE SUMMARY
[facility]  Discharge Note  Short Stay    Admit Date: 4/11/2017    Discharge Date and Time: No discharge date for patient encounter.     Attending Physician: RHEA Ramos MD     Discharge Provider: MIAH Ramos    Diagnoses:  Active Hospital Problems    Diagnosis  POA    Right ureteral stone [N20.1]  Yes      Resolved Hospital Problems    Diagnosis Date Resolved POA   No resolved problems to display.       Discharged Condition: good    Hospital Course: Patient was admitted for an outpatient procedure and tolerated the procedure well with no complications.    Final Diagnoses: Same as principal problem.    Disposition: Home or Self Care    Follow up/Patient Instructions:    Medications:  Reconciled Home Medications:   Current Discharge Medication List      START taking these medications    Details   hydrocodone-acetaminophen 7.5-325mg (NORCO) 7.5-325 mg per tablet Take 1 tablet by mouth every 6 (six) hours as needed for Pain.  Qty: 30 tablet, Refills: 0         CONTINUE these medications which have NOT CHANGED    Details   CARBOXYMETHYLCELLULOSE SODIUM (LUBRICANT EYE DROPS OPHT) Apply to eye.      COMBIGAN 0.2-0.5 % Drop INSTILL 1 DROP INTO LEFT EYE TWICE DAILY  Qty: 10 mL, Refills: 0      multivitamin capsule Take 1 capsule by mouth once daily.      tamsulosin (FLOMAX) 0.4 mg Cp24 Take 1 capsule (0.4 mg total) by mouth once daily.  Qty: 30 capsule, Refills: 11    Associated Diagnoses: BPH (benign prostatic hypertrophy) with urinary obstruction; Urinary retention             Discharge Procedure Orders  Diet general     Activity as tolerated   Scheduling Instructions: Keep hydrated  Strain urine     Call MD for:  temperature >100.4     Call MD for:  persistent nausea and vomiting or diarrhea     Call MD for:  severe uncontrolled pain           Discharge Procedure Orders (must include Diet, Follow-up, Activity):    Discharge Procedure Orders (must include Diet, Follow-up, Activity)  Diet general  Rx ready at the ; spoke with pt and informed.       Activity as tolerated   Scheduling Instructions: Keep hydrated  Strain urine     Call MD for:  temperature >100.4     Call MD for:  persistent nausea and vomiting or diarrhea     Call MD for:  severe uncontrolled pain       Follow up in 2 weeks

## 2017-09-06 NOTE — TELEPHONE ENCOUNTER
Spoke with patient, explained that stone noted on CT Scan is large and he will not pass.  We can do Left ESWL if patient wishes to, there is no urgency and it is up to him if he chooses to treat the stone.  Patient wishes to not pursue surgery at this time, instructed him to stay on the Flomax.

## 2017-10-10 ENCOUNTER — IMMUNIZATION (OUTPATIENT)
Dept: FAMILY MEDICINE | Facility: CLINIC | Age: 82
End: 2017-10-10
Payer: MEDICARE

## 2017-10-10 PROCEDURE — G0008 ADMIN INFLUENZA VIRUS VAC: HCPCS | Mod: PBBFAC,PO

## 2017-11-06 ENCOUNTER — LAB VISIT (OUTPATIENT)
Dept: LAB | Facility: HOSPITAL | Age: 82
End: 2017-11-06
Attending: INTERNAL MEDICINE
Payer: MEDICARE

## 2017-11-06 DIAGNOSIS — N18.30 CKD (CHRONIC KIDNEY DISEASE) STAGE 3, GFR 30-59 ML/MIN: ICD-10-CM

## 2017-11-06 LAB
ALBUMIN SERPL BCP-MCNC: 3.5 G/DL
ANION GAP SERPL CALC-SCNC: 7 MMOL/L
BUN SERPL-MCNC: 29 MG/DL
CALCIUM SERPL-MCNC: 9.3 MG/DL
CHLORIDE SERPL-SCNC: 107 MMOL/L
CO2 SERPL-SCNC: 26 MMOL/L
CREAT SERPL-MCNC: 2 MG/DL
EST. GFR  (AFRICAN AMERICAN): 35.4 ML/MIN/1.73 M^2
EST. GFR  (NON AFRICAN AMERICAN): 30.6 ML/MIN/1.73 M^2
GLUCOSE SERPL-MCNC: 94 MG/DL
PHOSPHATE SERPL-MCNC: 3.5 MG/DL
POTASSIUM SERPL-SCNC: 4.6 MMOL/L
PTH-INTACT SERPL-MCNC: 93 PG/ML
SODIUM SERPL-SCNC: 140 MMOL/L

## 2017-11-06 PROCEDURE — 80069 RENAL FUNCTION PANEL: CPT

## 2017-11-06 PROCEDURE — 83970 ASSAY OF PARATHORMONE: CPT

## 2017-11-06 PROCEDURE — 36415 COLL VENOUS BLD VENIPUNCTURE: CPT | Mod: PO

## 2017-11-15 ENCOUNTER — TELEPHONE (OUTPATIENT)
Dept: NEPHROLOGY | Facility: CLINIC | Age: 82
End: 2017-11-15

## 2017-11-20 ENCOUNTER — OFFICE VISIT (OUTPATIENT)
Dept: NEPHROLOGY | Facility: CLINIC | Age: 82
End: 2017-11-20
Payer: MEDICARE

## 2017-11-20 VITALS
OXYGEN SATURATION: 99 % | SYSTOLIC BLOOD PRESSURE: 110 MMHG | DIASTOLIC BLOOD PRESSURE: 58 MMHG | WEIGHT: 174.81 LBS | HEIGHT: 74 IN | BODY MASS INDEX: 22.43 KG/M2 | HEART RATE: 54 BPM

## 2017-11-20 DIAGNOSIS — E87.5 HYPERKALEMIA: ICD-10-CM

## 2017-11-20 DIAGNOSIS — N18.30 CKD (CHRONIC KIDNEY DISEASE) STAGE 3, GFR 30-59 ML/MIN: Primary | ICD-10-CM

## 2017-11-20 DIAGNOSIS — N18.4 CKD (CHRONIC KIDNEY DISEASE) STAGE 4, GFR 15-29 ML/MIN: ICD-10-CM

## 2017-11-20 DIAGNOSIS — N20.0 CALCIUM OXALATE KIDNEY STONES: ICD-10-CM

## 2017-11-20 PROCEDURE — 99999 PR PBB SHADOW E&M-EST. PATIENT-LVL III: CPT | Mod: PBBFAC,,, | Performed by: INTERNAL MEDICINE

## 2017-11-20 PROCEDURE — 99213 OFFICE O/P EST LOW 20 MIN: CPT | Mod: S$PBB,,, | Performed by: INTERNAL MEDICINE

## 2017-11-20 PROCEDURE — 99213 OFFICE O/P EST LOW 20 MIN: CPT | Mod: PBBFAC,PO | Performed by: INTERNAL MEDICINE

## 2017-11-28 PROBLEM — E87.5 HYPERKALEMIA: Status: ACTIVE | Noted: 2017-11-28

## 2017-11-28 NOTE — PROGRESS NOTES
Subjective:       Patient ID: Farnaz Jarquin is a 81 y.o. White male who presents for follow-up evaluation of Chronic Kidney Disease and Hyperkalemia    HPI     He reports he is doing well. No edema nor SOB. His appetite is good and weight is stable. No new medications since last visit. He denies LUTS      Review of Systems   Constitutional: Negative for appetite change, fever and unexpected weight change.   Respiratory: Negative for shortness of breath.    Cardiovascular: Negative for chest pain and leg swelling.   Gastrointestinal: Negative for constipation and diarrhea.   Genitourinary: Negative for difficulty urinating, flank pain and hematuria.   Musculoskeletal: Positive for arthralgias and back pain.   Neurological: Negative for weakness and headaches.       Objective:      Physical Exam   Constitutional: He is oriented to person, place, and time. He appears well-developed and well-nourished. No distress.   Neck: No JVD present.   Cardiovascular: S1 normal and S2 normal.  Exam reveals no friction rub.    Pulmonary/Chest: Breath sounds normal. He has no wheezes. He has no rales.   Abdominal: Soft.   Musculoskeletal: He exhibits no edema.   Neurological: He is alert and oriented to person, place, and time.   Skin: Skin is warm and dry.   Psychiatric: He has a normal mood and affect.   Vitals reviewed.      Assessment:       1. CKD (chronic kidney disease) stage 3, GFR 30-59 ml/min    2. 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 Calcium oxalate kidney stones    4. Hyperkalemia on low potassium diet        Plan:             CKD Stage 3/4 with stable kidney function     Pain--no NSAID use      RTC 4 months with labs prior

## 2017-12-11 RX ORDER — BRIMONIDINE TARTRATE, TIMOLOL MALEATE 2; 5 MG/ML; MG/ML
SOLUTION/ DROPS OPHTHALMIC
Qty: 10 ML | Refills: 0 | Status: SHIPPED | OUTPATIENT
Start: 2017-12-11 | End: 2019-07-03

## 2018-03-19 ENCOUNTER — OFFICE VISIT (OUTPATIENT)
Dept: NEPHROLOGY | Facility: CLINIC | Age: 83
End: 2018-03-19
Payer: MEDICARE

## 2018-03-19 ENCOUNTER — OFFICE VISIT (OUTPATIENT)
Dept: UROLOGY | Facility: CLINIC | Age: 83
End: 2018-03-19
Payer: MEDICARE

## 2018-03-19 VITALS
DIASTOLIC BLOOD PRESSURE: 76 MMHG | BODY MASS INDEX: 22.66 KG/M2 | HEIGHT: 74 IN | WEIGHT: 176.56 LBS | OXYGEN SATURATION: 95 % | HEART RATE: 65 BPM | SYSTOLIC BLOOD PRESSURE: 114 MMHG

## 2018-03-19 VITALS
BODY MASS INDEX: 22.63 KG/M2 | SYSTOLIC BLOOD PRESSURE: 120 MMHG | WEIGHT: 176.38 LBS | HEIGHT: 74 IN | DIASTOLIC BLOOD PRESSURE: 75 MMHG | HEART RATE: 60 BPM

## 2018-03-19 DIAGNOSIS — N18.4 CKD (CHRONIC KIDNEY DISEASE) STAGE 4, GFR 15-29 ML/MIN: Primary | ICD-10-CM

## 2018-03-19 DIAGNOSIS — N20.0 CALCIUM OXALATE KIDNEY STONES: ICD-10-CM

## 2018-03-19 DIAGNOSIS — N40.1 ENLARGED PROSTATE WITH URINARY OBSTRUCTION: Primary | ICD-10-CM

## 2018-03-19 DIAGNOSIS — E87.5 HYPERKALEMIA: ICD-10-CM

## 2018-03-19 DIAGNOSIS — R35.1 NOCTURIA MORE THAN TWICE PER NIGHT: ICD-10-CM

## 2018-03-19 DIAGNOSIS — R35.0 INCREASED URINARY FREQUENCY: ICD-10-CM

## 2018-03-19 DIAGNOSIS — N13.8 ENLARGED PROSTATE WITH URINARY OBSTRUCTION: Primary | ICD-10-CM

## 2018-03-19 LAB
BILIRUB SERPL-MCNC: ABNORMAL MG/DL
BLOOD URINE, POC: ABNORMAL
COLOR, POC UA: YELLOW
GLUCOSE UR QL STRIP: ABNORMAL
KETONES UR QL STRIP: ABNORMAL
LEUKOCYTE ESTERASE URINE, POC: ABNORMAL
NITRITE, POC UA: ABNORMAL
PH, POC UA: 5
PROTEIN, POC: ABNORMAL
SPECIFIC GRAVITY, POC UA: 1.02
UROBILINOGEN, POC UA: ABNORMAL

## 2018-03-19 PROCEDURE — 81002 URINALYSIS NONAUTO W/O SCOPE: CPT | Mod: PBBFAC,PO | Performed by: UROLOGY

## 2018-03-19 PROCEDURE — 99213 OFFICE O/P EST LOW 20 MIN: CPT | Mod: PBBFAC,PO | Performed by: UROLOGY

## 2018-03-19 PROCEDURE — 99213 OFFICE O/P EST LOW 20 MIN: CPT | Mod: PBBFAC,27,PO | Performed by: INTERNAL MEDICINE

## 2018-03-19 PROCEDURE — 99999 PR PBB SHADOW E&M-EST. PATIENT-LVL III: CPT | Mod: PBBFAC,,, | Performed by: INTERNAL MEDICINE

## 2018-03-19 PROCEDURE — 99999 PR PBB SHADOW E&M-EST. PATIENT-LVL III: CPT | Mod: PBBFAC,,, | Performed by: UROLOGY

## 2018-03-19 PROCEDURE — 99213 OFFICE O/P EST LOW 20 MIN: CPT | Mod: S$PBB,,, | Performed by: UROLOGY

## 2018-03-19 PROCEDURE — 99213 OFFICE O/P EST LOW 20 MIN: CPT | Mod: S$PBB,,, | Performed by: INTERNAL MEDICINE

## 2018-03-19 RX ORDER — ALFUZOSIN HYDROCHLORIDE 10 MG/1
10 TABLET, EXTENDED RELEASE ORAL
Qty: 30 TABLET | Refills: 12 | Status: SHIPPED | OUTPATIENT
Start: 2018-03-19 | End: 2020-08-05

## 2018-03-19 RX ORDER — ALFUZOSIN HYDROCHLORIDE 10 MG/1
10 TABLET, EXTENDED RELEASE ORAL
Qty: 30 TABLET | Refills: 12 | Status: SHIPPED | OUTPATIENT
Start: 2018-03-19 | End: 2018-03-19 | Stop reason: SDUPTHER

## 2018-03-19 NOTE — PROGRESS NOTES
Subjective:       Patient ID: Farnaz Jarquin is a 82 y.o. male.    Chief Complaint: Benign Prostatic Hypertrophy    HPI     82 year old s/p right ESWL for obstructing right proximal ureteral stone last year.  He denies hematuria and dysuria but complains of worsening obstructive symptoms.  He was given a trial of Flomax.  He took for a few months but did not feel there was any improvement.      Urine dipstick shows negative for all components.    Review of Systems   Constitutional: Negative for fever.   Genitourinary: Negative for dysuria and hematuria.       Objective:      Physical Exam   Constitutional: He is oriented to person, place, and time. He appears well-developed and well-nourished.   Pulmonary/Chest: Effort normal.   Abdominal: Soft.   Neurological: He is alert and oriented to person, place, and time.   Skin: No rash noted.   Psychiatric: He has a normal mood and affect.   Vitals reviewed.      Assessment:       1. Enlarged prostate with urinary obstruction    2. Increased urinary frequency    3. Nocturia more than twice per night        Plan:       Enlarged prostate with urinary obstruction  -     POCT URINE DIPSTICK WITHOUT MICROSCOPE    Increased urinary frequency    Nocturia more than twice per night    Other orders  -     Discontinue: alfuzosin (UROXATRAL) 10 mg Tb24; Take 1 tablet (10 mg total) by mouth daily with dinner or evening meal.  Dispense: 30 tablet; Refill: 12  -     alfuzosin (UROXATRAL) 10 mg Tb24; Take 1 tablet (10 mg total) by mouth daily with dinner or evening meal.  Dispense: 30 tablet; Refill: 12      We discussed treatment options including surgery to which he in not interested.  Rec Alfuzosin and continue until seen in follow up.

## 2018-04-07 NOTE — PROGRESS NOTES
Subjective:       Patient ID: Farnaz Jarquin is a 82 y.o. White male who presents for follow-up evaluation of Chronic Kidney Disease    HPI     He reports he is doing well. No edema nor SOB. His appetite is good and weight is stable. No new medications since last visit. He denies LUTS since being on Uroxatral      Review of Systems   Constitutional: Negative for appetite change, fever and unexpected weight change.   Respiratory: Negative for shortness of breath.    Cardiovascular: Negative for chest pain and leg swelling.   Gastrointestinal: Negative for constipation and diarrhea.   Genitourinary: Negative for difficulty urinating, flank pain and hematuria.   Musculoskeletal: Positive for arthralgias and back pain.   Neurological: Negative for weakness and headaches.       Objective:      Physical Exam   Constitutional: He is oriented to person, place, and time. He appears well-developed and well-nourished. No distress.   Neck: No JVD present.   Cardiovascular: S1 normal and S2 normal.  Exam reveals no friction rub.    Pulmonary/Chest: Breath sounds normal. He has no wheezes. He has no rales.   Abdominal: Soft.   Musculoskeletal: He exhibits no edema.   Neurological: He is alert and oriented to person, place, and time.   Skin: Skin is warm and dry.   Psychiatric: He has a normal mood and affect.   Vitals reviewed.      Assessment:       1. 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 Calcium oxalate kidney stones    3. Hyperkalemia on low potassium diet        Plan:             CKD Stage 3/4 with stable kidney function     Pain--no NSAID use      RTC 4 months with labs prior

## 2018-05-28 ENCOUNTER — TELEPHONE (OUTPATIENT)
Dept: DERMATOLOGY | Facility: CLINIC | Age: 83
End: 2018-05-28

## 2018-05-28 NOTE — TELEPHONE ENCOUNTER
Pt contacted with appt to see Dr Pizarro     ----- Message from Samina Staples LPN sent at 5/28/2018  1:42 PM CDT -----  Contact: Patient  Mick pt    ----- Message -----  From: Karolyn Montez  Sent: 5/28/2018  12:45 PM  To: Mick [facility]FFRI[facility] Staff    Type:  Same Day Appointment Request    Caller is requesting a same day appointment.  Caller declined first available appointment listed below.      Name of Caller:  patient  When is the first available appointment?  In July  Symptoms:  Bump on right arm  Best Call Back Number:   Additional Information:  Calling to be seen in the office today. Please advise.

## 2018-05-29 ENCOUNTER — OFFICE VISIT (OUTPATIENT)
Dept: DERMATOLOGY | Facility: CLINIC | Age: 83
End: 2018-05-29
Payer: MEDICARE

## 2018-05-29 VITALS — BODY MASS INDEX: 22.59 KG/M2 | WEIGHT: 176 LBS | HEIGHT: 74 IN

## 2018-05-29 DIAGNOSIS — D48.5 NEOPLASM OF UNCERTAIN BEHAVIOR OF SKIN: Primary | ICD-10-CM

## 2018-05-29 DIAGNOSIS — L57.0 ACTINIC KERATOSES: ICD-10-CM

## 2018-05-29 DIAGNOSIS — L82.1 SEBORRHEIC KERATOSIS: ICD-10-CM

## 2018-05-29 PROCEDURE — 88305 TISSUE EXAM BY PATHOLOGIST: CPT | Performed by: PATHOLOGY

## 2018-05-29 PROCEDURE — 17000 DESTRUCT PREMALG LESION: CPT | Mod: PBBFAC,PO | Performed by: DERMATOLOGY

## 2018-05-29 PROCEDURE — 11100 PR BIOPSY OF SKIN LESION: CPT | Mod: 59,PBBFAC,PO | Performed by: DERMATOLOGY

## 2018-05-29 PROCEDURE — 17003 DESTRUCT PREMALG LES 2-14: CPT | Mod: PBBFAC,PO | Performed by: DERMATOLOGY

## 2018-05-29 PROCEDURE — 17000 DESTRUCT PREMALG LESION: CPT | Mod: S$PBB,51,, | Performed by: DERMATOLOGY

## 2018-05-29 PROCEDURE — 99213 OFFICE O/P EST LOW 20 MIN: CPT | Mod: PBBFAC,PO | Performed by: DERMATOLOGY

## 2018-05-29 PROCEDURE — 99213 OFFICE O/P EST LOW 20 MIN: CPT | Mod: 25,S$PBB,, | Performed by: DERMATOLOGY

## 2018-05-29 PROCEDURE — 99999 PR PBB SHADOW E&M-EST. PATIENT-LVL III: CPT | Mod: PBBFAC,,, | Performed by: DERMATOLOGY

## 2018-05-29 PROCEDURE — 17003 DESTRUCT PREMALG LES 2-14: CPT | Mod: S$PBB,,, | Performed by: DERMATOLOGY

## 2018-05-29 PROCEDURE — 11100 PR BIOPSY OF SKIN LESION: CPT | Mod: S$PBB,59,, | Performed by: DERMATOLOGY

## 2018-05-29 NOTE — PROGRESS NOTES
Subjective:       Patient ID:  Farnaz Jarquin is a 82 y.o. male who presents for   Chief Complaint   Patient presents with    Lesion     Pt here for follow up skin check. 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        Objective:    Physical Exam   Constitutional: He appears well-developed and well-nourished. No distress.   HENT:   Head:       Mouth/Throat: Lips normal.    Eyes: Lids are normal.    Neurological: He is alert and oriented to person, place, and time. He is not disoriented.   Psychiatric: He has a normal mood and affect.   Skin:   Areas Examined (abnormalities noted in diagram):   Scalp / Hair Palpated and Inspected  Head / Face Inspection Performed  Neck Inspection Performed  Chest / Axilla Inspection Performed  Abdomen Inspection Performed  Back Inspection Performed  RUE Inspected  LUE Inspection Performed              Diagram Legend     Erythematous scaling macule/papule c/w actinic keratosis       Vascular papule c/w angioma      Pigmented verrucoid papule/plaque c/w seborrheic keratosis      Yellow umbilicated papule c/w sebaceous hyperplasia      Irregularly shaped tan macule c/w lentigo     1-2 mm smooth white papules consistent with Milia      Movable subcutaneous cyst with punctum c/w epidermal inclusion cyst      Subcutaneous movable cyst c/w pilar cyst      Firm pink to brown papule c/w dermatofibroma      Pedunculated fleshy papule(s) c/w skin tag(s)      Evenly pigmented macule c/w junctional nevus      Mildly variegated pigmented, slightly irregular-bordered macule c/w mildly atypical nevus      Flesh colored to evenly pigmented papule c/w intradermal nevus       Pink pearly papule/plaque c/w basal cell carcinoma      Erythematous hyperkeratotic cursted plaque c/w SCC      Surgical scar with no sign of skin cancer recurrence      Open and closed comedones      Inflammatory papules and pustules      Verrucoid papule consistent consistent with wart     Erythematous eczematous patches and plaques     Dystrophic onycholytic nail with subungual debris c/w onychomycosis     Umbilicated papule    Erythematous-base heme-crusted tan verrucoid plaque consistent with inflamed seborrheic keratosis     Erythematous Silvery Scaling Plaque c/w Psoriasis     See annotation      Assessment / Plan:      Pathology Orders:     Normal Orders This Visit    Tissue Specimen To Pathology, Dermatology     Questions:    Directional Terms:  Other(comment)    Clinical information:  scc vs other    Specific Site:  right forearm        Neoplasm of uncertain behavior of skin  -     Tissue Specimen To Pathology, Dermatology  Will excise if malignant  Shave biopsy procedure note:    Shave biopsy performed after verbal consent including risk of infection, scar, recurrence, need for additional treatment of site. Area prepped with alcohol, anesthetized with approximately 1.0cc of 1% lidocaine with epinephrine. Lesional tissue shaved with razor blade. Hemostasis achieved with application of aluminum chloride followed by hyfrecation. No complications. Dressing applied. Wound care explained.      Actinic keratoses  Cryosurgery Procedure Note    Verbal consent from the patient is obtained and the patient is aware of the precancerous quality and need for treatment of these lesions. Liquid nitrogen cryosurgery is applied to the 3 actinic keratoses, as detailed in the physical exam, to produce a freeze injury. The patient is aware that blisters may form  and is instructed on wound care with gentle cleansing and use of vaseline ointment to keep moist until healed. The patient is supplied a handout on cryosurgery and is instructed to call if lesions do not completely resolve. Discussed risk postinflammatory pigmentary changes.     Seborrheic keratosis, face  These are benign inherited growths without a malignant potential. Reassurance given to patient. No treatment is necessary.                Follow-up in about 6 months (around 11/29/2018).

## 2018-06-12 ENCOUNTER — TELEPHONE (OUTPATIENT)
Dept: DERMATOLOGY | Facility: CLINIC | Age: 83
End: 2018-06-12

## 2018-06-12 NOTE — TELEPHONE ENCOUNTER
----- Message from Andre Dubose sent at 6/12/2018 12:38 PM CDT -----  Contact: Farnaz Jarquin            Name of Who is Calling: Farnaz Jarquin    What is the request in detail: Patient called in regards to an upcoming appt      Can the clinic reply by [facility]: No      What Number to Call Back if not in [facility]: [phone]

## 2018-06-12 NOTE — TELEPHONE ENCOUNTER
----- Message from Coleen Pizarro MD sent at 6/11/2018  9:58 AM CDT -----  Please call patient with results and schedule procedure for definitive excision.        FINAL PATHOLOGIC DIAGNOSIS  [facility] DIAGNOSIS:  RIGHT FOREARM:  -SQUAMOUS CELL CARCINOMA, WELL-DIFFERENTIATED.  -THE LESION INVOLVES THE DEEP MARGIN OF THE BIOPSY.  HERLINDA Valle., FCAP  Report attached.  Performing site:  [facility], [address]

## 2018-07-31 ENCOUNTER — PROCEDURE VISIT (OUTPATIENT)
Dept: DERMATOLOGY | Facility: CLINIC | Age: 83
End: 2018-07-31
Payer: MEDICARE

## 2018-07-31 DIAGNOSIS — C44.622 SQUAMOUS CELL CARCINOMA OF ARM, RIGHT: Primary | ICD-10-CM

## 2018-07-31 PROCEDURE — 12032 INTMD RPR S/A/T/EXT 2.6-7.5: CPT | Mod: PBBFAC,PO | Performed by: DERMATOLOGY

## 2018-07-31 PROCEDURE — 11602 EXC TR-EXT MAL+MARG 1.1-2 CM: CPT | Mod: 51,S$PBB,, | Performed by: DERMATOLOGY

## 2018-07-31 PROCEDURE — 99499 UNLISTED E&M SERVICE: CPT | Mod: S$PBB,,, | Performed by: DERMATOLOGY

## 2018-07-31 PROCEDURE — 11602 EXC TR-EXT MAL+MARG 1.1-2 CM: CPT | Mod: PBBFAC,PO | Performed by: DERMATOLOGY

## 2018-07-31 PROCEDURE — 12032 INTMD RPR S/A/T/EXT 2.6-7.5: CPT | Mod: S$PBB,,, | Performed by: DERMATOLOGY

## 2018-07-31 PROCEDURE — 88305 TISSUE EXAM BY PATHOLOGIST: CPT | Performed by: PATHOLOGY

## 2018-07-31 NOTE — PROGRESS NOTES
PROCEDURE: Elliptical excision with intermediate layered repair in order to decrease dead space and decrease tension.    ANESTHETIC: 12 cc 1% Xylocaine with Epinephrine 1:100,000, buffered    SURGEON: Coleen Pizarro MD      ASSISTANTS: Alisa Redd MA      PREOPERATIVE DIAGNOSIS:  Biopsy-proven Squamous Cell Carcinoma    POSTOPERATIVE DIAGNOSIS:  Same as preoperative diagnosis    PATHOLOGIC DIAGNOSIS: Pending    LOCATION: right forearm    INITIAL LESION SIZE: 1 cm    EXCISED DIAMETER: 1.8 cm    PREPARATION: The diagnosis, procedure, alternatives, benefits and risks, including but not limited to: infection, bleeding/bruising, drug reactions, pain, scar or cosmetic defect, local sensation disturbances, wound dehiscence (separation of wound edges after sutures removed) and/or recurrence of present condition were explained to the patient. The patient elected to proceed.  Patient's identity was verified using 2 patient identifiers and the side and site was verified.  Time out period with surgeon, assistant and patient in surgical suite was taken.    PROCEDURE: The location noted above was prepped and draped by Alisa Redd MA in the usual sterile fashion. The area was anesthetized by myself. Lesional tissue was carefully marked with at least 4 mm margins of clinically normal skin in all directions. A fusiform elliptical excision was done with #15 blade carried down completely through the dermis into the deep subcutaneous tissues to the level of the non-muscle fascia, and dissection was carried out in that plane.  Electrocoagulation was used to obtain hemostasis. Blood loss was minimal. The wound was then approximated in a layered fashion with subcutaneous and intradermal sutures of 4.0 Monocryl, approximately 5 in number, and the wound was then superficially closed with simple interrupted sutures of 4.0 Prolene.    The patient tolerated the procedure well.    The area was cleaned and dressed appropriately and  the patient was given wound care instructions, as well as an appointment for follow-up evaluation.    LENGTH OF REPAIR: 3 cm

## 2018-08-08 ENCOUNTER — TELEPHONE (OUTPATIENT)
Dept: DERMATOLOGY | Facility: CLINIC | Age: 83
End: 2018-08-08

## 2018-08-08 NOTE — TELEPHONE ENCOUNTER
"----- Message from Coleen Pizarro MD sent at 8/2/2018  7:57 PM CDT -----  Please call pt to inform skin cancer was completely excised. F/u for SR as scheduled.     FINAL PATHOLOGIC DIAGNOSIS  1. Skin, right forearm, excision:  - SCAR, NO RESIDUAL SQUAMOUS CELL CARCINOMA IS PRESENT.  - INCIDENTAL SEBORRHEIC KERATOSIS.  MICROSCOPIC DESCRIPTION: Sections show a horizontally oriented proliferation of fibroblasts with  perpendicularly oriented dilated blood vessels in the absence of residual squamous cell carcinoma. Within the  12:00 tip margin, there is an abrupt proliferation of benign basaloid keratinocytes exhibiting acanthosis,  hyperkeratosis, papillomatosis and horn pseudocyst formation.  Diagnosed by: Casey Loomis M.D.  (Electronically Signed: 2018-08-02 17:31:19)  Gross Description  Received in formalin in a specimen container labeled with the patient's name, patient's ID and pathology ID  [id number], "right forearm", is a 2.0 x 1.3 x 0.4 cm tan skin ellipse, 12:00 margin was previously marked by white  surgical stitch. On skin surface, a 0.7 x 0.3 cm tan-gray possible scar-like lesion is identified within approximately  0.4 cm from 5:00 resection margin. No other masses or lesions are identified. Specimen inked as follows: From  12-3 to 6:00 margin blue, from 6-9 to 12:00 margin black. Specimen is serially sectioned from 12:00 to 6:00 and  entirely submitted in cassettes [id number] as follows:  A. 12:00 tip  B D. Body  E. 6:00 tip.  Hakeem Mann  Page  "

## 2018-08-08 NOTE — TELEPHONE ENCOUNTER
----- Message from Quin Woo sent at 8/8/2018 12:50 PM CDT -----  Contact: [phone]  Patient is returning nurse's phone call.  Please call patient back at [phone].

## 2018-09-05 RX ORDER — BRIMONIDINE TARTRATE AND TIMOLOL MALEATE 2; 5 MG/ML; MG/ML
SOLUTION OPHTHALMIC
Qty: 10 ML | Refills: 11 | Status: SHIPPED | OUTPATIENT
Start: 2018-09-05 | End: 2020-08-05

## 2019-03-26 ENCOUNTER — OFFICE VISIT (OUTPATIENT)
Dept: FAMILY MEDICINE | Facility: CLINIC | Age: 84
End: 2019-03-26
Payer: MEDICARE

## 2019-03-26 VITALS
HEART RATE: 60 BPM | RESPIRATION RATE: 18 BRPM | OXYGEN SATURATION: 97 % | WEIGHT: 177.56 LBS | BODY MASS INDEX: 22.79 KG/M2 | HEIGHT: 74 IN | TEMPERATURE: 98 F | DIASTOLIC BLOOD PRESSURE: 80 MMHG | SYSTOLIC BLOOD PRESSURE: 132 MMHG

## 2019-03-26 DIAGNOSIS — I77.1 TORTUOUS AORTA: ICD-10-CM

## 2019-03-26 DIAGNOSIS — N18.4 CKD (CHRONIC KIDNEY DISEASE) STAGE 4, GFR 15-29 ML/MIN: ICD-10-CM

## 2019-03-26 DIAGNOSIS — Z23 NEED FOR PNEUMOCOCCAL VACCINE: ICD-10-CM

## 2019-03-26 DIAGNOSIS — Z23 NEED FOR DIPHTHERIA-TETANUS-PERTUSSIS (TDAP) VACCINE: ICD-10-CM

## 2019-03-26 DIAGNOSIS — J34.89 INFECTED LESION IN NOSE: Primary | ICD-10-CM

## 2019-03-26 DIAGNOSIS — N18.30 CKD (CHRONIC KIDNEY DISEASE), STAGE III: ICD-10-CM

## 2019-03-26 PROCEDURE — 99499 UNLISTED E&M SERVICE: CPT | Mod: S$PBB,,, | Performed by: INTERNAL MEDICINE

## 2019-03-26 PROCEDURE — 99499 RISK ADDL DX/OHS AUDIT: ICD-10-PCS | Mod: S$PBB,,, | Performed by: INTERNAL MEDICINE

## 2019-03-26 PROCEDURE — 99213 OFFICE O/P EST LOW 20 MIN: CPT | Mod: S$PBB,,, | Performed by: INTERNAL MEDICINE

## 2019-03-26 PROCEDURE — 99213 PR OFFICE/OUTPT VISIT, EST, LEVL III, 20-29 MIN: ICD-10-PCS | Mod: S$PBB,,, | Performed by: INTERNAL MEDICINE

## 2019-03-26 PROCEDURE — 99999 PR PBB SHADOW E&M-EST. PATIENT-LVL III: ICD-10-PCS | Mod: PBBFAC,,, | Performed by: INTERNAL MEDICINE

## 2019-03-26 PROCEDURE — G0009 ADMIN PNEUMOCOCCAL VACCINE: HCPCS | Mod: PBBFAC,PO

## 2019-03-26 PROCEDURE — 99213 OFFICE O/P EST LOW 20 MIN: CPT | Mod: PBBFAC,PO | Performed by: INTERNAL MEDICINE

## 2019-03-26 PROCEDURE — 99999 PR PBB SHADOW E&M-EST. PATIENT-LVL III: CPT | Mod: PBBFAC,,, | Performed by: INTERNAL MEDICINE

## 2019-03-26 RX ORDER — MUPIROCIN 20 MG/G
OINTMENT TOPICAL 3 TIMES DAILY
Qty: 22 G | Refills: 0 | Status: SHIPPED | OUTPATIENT
Start: 2019-03-26 | End: 2019-04-05

## 2019-03-26 RX ORDER — IBUPROFEN 200 MG
200 TABLET ORAL EVERY 6 HOURS PRN
COMMUNITY
End: 2019-06-27

## 2019-03-26 NOTE — PROGRESS NOTES
Subjective:       Patient ID: Farnaz Jarquin is a 83 y.o. male.    Chief Complaint: Pain (nose)    Complains of mild - moderate pain on inside of left nostril for 2-3 weeks associated with a scab.  He has been putting hydrogen peroxide on it with no improvement.  No fever or chills.  No nasal drainage/congestion or sore throat.   ckd iii/iv - stable; seeing renal  Tortuous aorta - no pain    Review of Systems   Constitutional: Negative for appetite change and fever.   HENT: Negative for nosebleeds and trouble swallowing.    Eyes: Negative for discharge and visual disturbance.   Respiratory: Negative for choking and shortness of breath.    Cardiovascular: Negative for chest pain and palpitations.   Gastrointestinal: Negative for abdominal pain, nausea and vomiting.   Musculoskeletal: Negative for arthralgias and joint swelling.   Skin: Negative for rash and wound.   Neurological: Negative for dizziness and syncope.   Psychiatric/Behavioral: Negative for confusion and dysphoric mood.       Objective:      Vitals:    03/26/19 1455   BP: 132/80   Pulse: 60   Resp: 18   Temp: 98 °F (36.7 °C)     Physical Exam   Constitutional: He appears well-nourished.   HENT:   Inside left nostril on lateral side + ulcerated lesion 1 cm linear    Eyes: Conjunctivae and EOM are normal.   Neck: Normal range of motion.   Cardiovascular: Normal rate and regular rhythm.   Pulmonary/Chest: Effort normal and breath sounds normal.   Musculoskeletal:   Normal ROM bilateral    Neurological: No cranial nerve deficit (grossly intact).   Skin: Skin is warm and dry.   Psychiatric: He has a normal mood and affect.   Alert and orientated   Vitals reviewed.        Assessment:       1. Infected lesion in nose    2. CKD (chronic kidney disease), stage III    3. Tortuous aorta    4. CKD (chronic kidney disease) stage 3/4        Plan:       Infected lesion in nose  -     mupirocin (BACTROBAN) 2 % ointment; Apply topically 3 (three) times daily. for 10  days  Dispense: 15 g; Refill: 0    CKD (chronic kidney disease), stage III    Tortuous aorta    CKD (chronic kidney disease) stage 3/4            Medication List with Changes/Refills   New Medications    MUPIROCIN (BACTROBAN) 2 % OINTMENT    Apply topically 3 (three) times daily. for 10 days   Current Medications    ALFUZOSIN (UROXATRAL) 10 MG TB24    Take 1 tablet (10 mg total) by mouth daily with dinner or evening meal.    BRIMONIDINE-TIMOLOL (COMBIGAN) 0.2-0.5 % DROP    INSTILL 1 DROP INTO THE LEFT EYE TWO TIMES A DAY    CARBOXYMETHYLCELLULOSE SODIUM (LUBRICANT EYE DROPS OPHT)    Apply to eye.    COMBIGAN 0.2-0.5 % DROP    INSTILL 1 DROP INTO LEFT EYE TWICE DAILY    IBUPROFEN (ADVIL,MOTRIN) 200 MG TABLET    Take 200 mg by mouth every 6 (six) hours as needed for Pain.    MULTIVITAMIN CAPSULE    Take 1 capsule by mouth once daily.     Nw, oral abx  Cont f/u w specialist   Continue current management and monitor.    Counseled on regular exercise, maintenance of a healthy weight, balanced diet rich in fruits/vegetables and lean protein, and avoidance of unhealthy habits like smoking and excessive alcohol intake.   Also, counseled on importance of being compliant with medication, health appointments, diet and exercise.     Follow up if symptoms worsen or fail to improve.

## 2019-06-25 ENCOUNTER — OFFICE VISIT (OUTPATIENT)
Dept: FAMILY MEDICINE | Facility: CLINIC | Age: 84
End: 2019-06-25
Payer: MEDICARE

## 2019-06-25 ENCOUNTER — LAB VISIT (OUTPATIENT)
Dept: LAB | Facility: HOSPITAL | Age: 84
End: 2019-06-25
Attending: NURSE PRACTITIONER
Payer: MEDICARE

## 2019-06-25 VITALS
TEMPERATURE: 98 F | WEIGHT: 171.31 LBS | OXYGEN SATURATION: 96 % | DIASTOLIC BLOOD PRESSURE: 74 MMHG | SYSTOLIC BLOOD PRESSURE: 98 MMHG | BODY MASS INDEX: 21.98 KG/M2 | HEIGHT: 74 IN | HEART RATE: 60 BPM

## 2019-06-25 DIAGNOSIS — R53.1 WEAKNESS: ICD-10-CM

## 2019-06-25 DIAGNOSIS — R42 DIZZINESS: Primary | ICD-10-CM

## 2019-06-25 DIAGNOSIS — N30.01 ACUTE CYSTITIS WITH HEMATURIA: ICD-10-CM

## 2019-06-25 DIAGNOSIS — N18.4 CKD (CHRONIC KIDNEY DISEASE) STAGE 4, GFR 15-29 ML/MIN: ICD-10-CM

## 2019-06-25 DIAGNOSIS — R79.89 BLOOD CREATININE INCREASED COMPARED WITH PRIOR MEASUREMENT: ICD-10-CM

## 2019-06-25 DIAGNOSIS — R94.4 DECREASED GFR: ICD-10-CM

## 2019-06-25 DIAGNOSIS — R42 DIZZINESS: ICD-10-CM

## 2019-06-25 LAB
ALBUMIN SERPL BCP-MCNC: 4.3 G/DL (ref 3.5–5.2)
ALP SERPL-CCNC: 51 U/L (ref 55–135)
ALT SERPL W/O P-5'-P-CCNC: 11 U/L (ref 10–44)
ANION GAP SERPL CALC-SCNC: 10 MMOL/L (ref 8–16)
AST SERPL-CCNC: 19 U/L (ref 10–40)
BASOPHILS # BLD AUTO: 0.07 K/UL (ref 0–0.2)
BASOPHILS NFR BLD: 1.6 % (ref 0–1.9)
BILIRUB SERPL-MCNC: 1 MG/DL (ref 0.1–1)
BILIRUB SERPL-MCNC: ABNORMAL MG/DL
BLOOD URINE, POC: ABNORMAL
BUN SERPL-MCNC: 26 MG/DL (ref 8–23)
CALCIUM SERPL-MCNC: 10 MG/DL (ref 8.7–10.5)
CHLORIDE SERPL-SCNC: 107 MMOL/L (ref 95–110)
CO2 SERPL-SCNC: 24 MMOL/L (ref 23–29)
COLOR, POC UA: ABNORMAL
CREAT SERPL-MCNC: 2.2 MG/DL (ref 0.5–1.4)
DIFFERENTIAL METHOD: ABNORMAL
EOSINOPHIL # BLD AUTO: 0 K/UL (ref 0–0.5)
EOSINOPHIL NFR BLD: 0.7 % (ref 0–8)
ERYTHROCYTE [DISTWIDTH] IN BLOOD BY AUTOMATED COUNT: 14.2 % (ref 11.5–14.5)
EST. GFR  (AFRICAN AMERICAN): 31 ML/MIN/1.73 M^2
EST. GFR  (NON AFRICAN AMERICAN): 27 ML/MIN/1.73 M^2
GLUCOSE SERPL-MCNC: 93 MG/DL (ref 70–110)
GLUCOSE UR QL STRIP: NORMAL
HCT VFR BLD AUTO: 43.7 % (ref 40–54)
HGB BLD-MCNC: 14.8 G/DL (ref 14–18)
KETONES UR QL STRIP: ABNORMAL
LEUKOCYTE ESTERASE URINE, POC: ABNORMAL
LYMPHOCYTES # BLD AUTO: 1.3 K/UL (ref 1–4.8)
LYMPHOCYTES NFR BLD: 29.5 % (ref 18–48)
MCH RBC QN AUTO: 31.3 PG (ref 27–31)
MCHC RBC AUTO-ENTMCNC: 33.9 G/DL (ref 32–36)
MCV RBC AUTO: 92 FL (ref 82–98)
MONOCYTES # BLD AUTO: 0.8 K/UL (ref 0.3–1)
MONOCYTES NFR BLD: 17.5 % (ref 4–15)
NEUTROPHILS # BLD AUTO: 2.2 K/UL (ref 1.8–7.7)
NEUTROPHILS NFR BLD: 50.7 % (ref 38–73)
NITRITE, POC UA: ABNORMAL
PH, POC UA: 5
PLATELET # BLD AUTO: 228 K/UL (ref 150–350)
PMV BLD AUTO: 10.3 FL (ref 9.2–12.9)
POTASSIUM SERPL-SCNC: 4.8 MMOL/L (ref 3.5–5.1)
PROT SERPL-MCNC: 7.5 G/DL (ref 6–8.4)
PROTEIN, POC: ABNORMAL
RBC # BLD AUTO: 4.73 M/UL (ref 4.6–6.2)
SODIUM SERPL-SCNC: 141 MMOL/L (ref 136–145)
SPECIFIC GRAVITY, POC UA: 1.02
UROBILINOGEN, POC UA: NORMAL
WBC # BLD AUTO: 4.34 K/UL (ref 3.9–12.7)

## 2019-06-25 PROCEDURE — 81002 URINALYSIS NONAUTO W/O SCOPE: CPT | Mod: PBBFAC,PO | Performed by: NURSE PRACTITIONER

## 2019-06-25 PROCEDURE — 99215 OFFICE O/P EST HI 40 MIN: CPT | Mod: PBBFAC,PO,25 | Performed by: NURSE PRACTITIONER

## 2019-06-25 PROCEDURE — 99214 PR OFFICE/OUTPT VISIT, EST, LEVL IV, 30-39 MIN: ICD-10-PCS | Mod: S$PBB,,, | Performed by: NURSE PRACTITIONER

## 2019-06-25 PROCEDURE — 87086 URINE CULTURE/COLONY COUNT: CPT

## 2019-06-25 PROCEDURE — 85025 COMPLETE CBC W/AUTO DIFF WBC: CPT | Mod: PO

## 2019-06-25 PROCEDURE — 80053 COMPREHEN METABOLIC PANEL: CPT | Mod: PO

## 2019-06-25 PROCEDURE — 93010 EKG 12-LEAD: ICD-10-PCS | Mod: S$PBB,,, | Performed by: INTERNAL MEDICINE

## 2019-06-25 PROCEDURE — 99999 PR PBB SHADOW E&M-EST. PATIENT-LVL V: ICD-10-PCS | Mod: PBBFAC,,, | Performed by: NURSE PRACTITIONER

## 2019-06-25 PROCEDURE — 99214 OFFICE O/P EST MOD 30 MIN: CPT | Mod: S$PBB,,, | Performed by: NURSE PRACTITIONER

## 2019-06-25 PROCEDURE — 93005 ELECTROCARDIOGRAM TRACING: CPT | Mod: PBBFAC,PO | Performed by: INTERNAL MEDICINE

## 2019-06-25 PROCEDURE — 99999 PR PBB SHADOW E&M-EST. PATIENT-LVL V: CPT | Mod: PBBFAC,,, | Performed by: NURSE PRACTITIONER

## 2019-06-25 PROCEDURE — 36415 COLL VENOUS BLD VENIPUNCTURE: CPT | Mod: PO

## 2019-06-25 PROCEDURE — 93010 ELECTROCARDIOGRAM REPORT: CPT | Mod: S$PBB,,, | Performed by: INTERNAL MEDICINE

## 2019-06-25 RX ORDER — DOXYCYCLINE 100 MG/1
100 CAPSULE ORAL 2 TIMES DAILY
Qty: 14 CAPSULE | Refills: 0 | Status: SHIPPED | OUTPATIENT
Start: 2019-06-25 | End: 2019-07-04

## 2019-06-25 NOTE — PROGRESS NOTES
Please call the patient and let him know that his blood work shows that his kidney function has decreased from last year. He needs to continue to push water and avoid alcohol. I want him to follow up with me on Friday.Please schedule. Please remind him that I sent in an antibiotic for his bladder infection. Thanks.

## 2019-06-25 NOTE — PROGRESS NOTES
"Subjective:       Patient ID: Farnaz Jarquin is a 83 y.o. male.    Chief Complaint: Dizziness    Patient who is new to me presents for dizziness. He started taking omega 3s and vitamin B12 and attributes the dizziness to these medicines. He drinks wine every day. Drinks water, but not as much as he should. He doesn't like to urinate often. His wife is sick "all the time." This has been causing stress. He denies headache but has "mild pressure behind the eyes." He is unable to see from the left eye. He denies any fever, SOB, Chest pain, or unilateral weakness. He does endorse generalized weakness.     Dizziness:   Chronicity:  New  Onset:  1 to 4 weeks ago (2 weeks ago)  Progression since onset:  Gradually worsening  Frequency:  Constantly  Severity:  Mild  Duration:  Off/on all day  Dizziness characteristics:  Off-balance  Frequency of Spells:  Daily   Associated symptoms: headaches (mild) and weakness.no ear congestion, no ear pain, no fever, no nausea, no vomiting, no diaphoresis, no aural fullness, no light-headedness, no syncope, no palpitations, no panic, no facial weakness, no slurred speech, no numbness in extremities and no chest pain.  Aggravated by:  Getting up, position changes and exertion  Treatments tried:  Nothing    Review of Systems   Constitutional: Negative for chills, diaphoresis, fatigue and fever.   HENT: Negative for congestion, ear pain, sinus pressure and sore throat.    Respiratory: Negative for shortness of breath and wheezing.    Cardiovascular: Negative for chest pain, palpitations, leg swelling and syncope.   Gastrointestinal: Negative for abdominal distention, abdominal pain, nausea and vomiting.   Genitourinary: Negative for dysuria and flank pain.   Skin: Negative for color change and pallor.   Neurological: Positive for dizziness, weakness and headaches (mild). Negative for light-headedness and numbness.       Objective:      Physical Exam   Constitutional: He is oriented to " person, place, and time. He appears well-developed and well-nourished.   HENT:   Head: Normocephalic and atraumatic.   Right Ear: External ear normal.   Left Ear: External ear normal.   Eyes: Pupils are equal, round, and reactive to light. Conjunctivae and EOM are normal.   Neck: Normal range of motion. Neck supple.   Cardiovascular: Normal rate and regular rhythm.   Pulmonary/Chest: Effort normal and breath sounds normal.   Abdominal: Soft. Bowel sounds are normal.   Musculoskeletal: Normal range of motion.   Neurological: He is alert and oriented to person, place, and time. He has normal strength and normal reflexes. No cranial nerve deficit or sensory deficit. He displays a negative Romberg sign.   NO focal deficits on neuro exam.    Skin: Skin is warm and dry.   Nursing note and vitals reviewed.      Assessment:       1. Dizziness    2. Weakness     3. CKD (chronic kidney disease) stage 4, GFR 15-29 ml/min    4. Blood creatinine increased compared with prior measurement    5. Decreased GFR    6. Acute cystitis with hematuria        Plan:       Dizziness  -     CBC auto differential; Future; Expected date: 06/25/2019  -     Comprehensive metabolic panel; Future; Expected date: 06/25/2019  -     POCT URINE DIPSTICK WITHOUT MICROSCOPE  -     IN OFFICE EKG 12-LEAD (to [place])  -     Cancel: Ambulatory referral to Cardiology  -     Urine culture  -     doxycycline (MONODOX) 100 MG capsule; Take 1 capsule (100 mg total) by mouth 2 (two) times daily. for 7 days  Dispense: 14 capsule; Refill: 0    Weakness   -     Urine culture  -     doxycycline (MONODOX) 100 MG capsule; Take 1 capsule (100 mg total) by mouth 2 (two) times daily. for 7 days  Dispense: 14 capsule; Refill: 0    CKD (chronic kidney disease) stage 4, GFR 15-29 ml/min  -     doxycycline (MONODOX) 100 MG capsule; Take 1 capsule (100 mg total) by mouth 2 (two) times daily. for 7 days  Dispense: 14 capsule; Refill: 0  -     Ambulatory referral to  Nephrology    Blood creatinine increased compared with prior measurement  -     Comprehensive metabolic panel; Future; Expected date: 06/25/2019  -     Ambulatory referral to Nephrology    Decreased GFR  -     Comprehensive metabolic panel; Future; Expected date: 06/25/2019  -     Ambulatory referral to Nephrology    Acute cystitis with hematuria  -     doxycycline (MONODOX) 100 MG capsule; Take 1 capsule (100 mg total) by mouth 2 (two) times daily. for 7 days  Dispense: 14 capsule; Refill: 0      Advised patient to stop taking ibuprofen and only use tylenol for pain. Urinalysis shows +leukocytes and blood will treat for uti. Advised patient to increase hydration and discussed s/s that warrant ED follow up. Will have patient follow up on Friday. Will refer to nephrology.

## 2019-06-25 NOTE — PATIENT INSTRUCTIONS
Chronic Kidney Disease (CKD)     The role of the kidneys is to remove waste products and extra water from the blood.  When the kidneys do not work as they should, waste products begin to build up in the blood. This is called chronic kidney disease (CKD). CKD means that you have kidney damage or a decrease in kidney function lasting at least 3 months. CKD allows extra water, waste, and toxins to build up in the body. This can eventually become life-threatening. You might need dialysis or a kidney transplant to stay alive. This most severe form is called end stage renal disease.  Diabetes is the leading causes of chronic renal failure. Other causes include high blood pressure, hardening of the arteries (atherosclerosis), lupus, inflammation of the blood vessels (vasculitis), and past viral or bacterial infections. Certain over-the-counter pain medicines can cause renal failure when taken often over a long period of time. These include aspirin, ibuprofen, and related anti-inflammatory medicines called NSAIDs (nonsteroidal anti-inflammatory drugs).  Home care  The following guidelines will help you care for yourself at home:  · If you have diabetes, talk with your healthcare provider about keeping your blood sugar under control. Ask if you need to make and changes to your diet, lifestyle, or medicines.  · If you have high blood pressure:  ¨ Take prescribed medicine to lower your blood pressure to the recommended goal of less than 130/80.  ¨ Start a regular exercise program that you enjoy. Check with your healthcare provider to be sure your planned exercise program is right for you.  ¨ Eat less salt (sodium). Your healthcare provider can tell you how much salt per day is safe for you.  · If you are overweight, talk with your healthcare provider about a weight loss plan.  · If you smoke, you must quit. Smoking makes kidney disease worse. Talk with your healthcare provider about ways to help you quit.  For more  information, visit the following links:  ¨ www.smokefree.gov/sites/default/files/pdf/clearing-the-air-accessible.pdf  ¨ www.smokefree.gov  ¨ www.cancer.org/healthy/stayawayfromtobacco/guidetoquittingsmoking/  · Most people with CKD need to follow a special diet.  Be sure you understand yours. In general, you will need to limit protein, salt, potassium, and phosphorus. You also need to limit how much fluid you drink.   · CKD is a risk factor for heart disease. Talk with your healthcare provider about any other risk factors you might have and what you can do to lessen them.  · Talk with your healthcare provider about any medicines you are taking to find out if they need to be reduced or stopped.  · Don't use the following over-the-counter medicines, or consult your healthcare provider before using:  ¨ Aspirin and NSAIDs such as ibuprofen or naproxen. Using acetaminophen for fever or pain is OK.  ¨ Laxatives and antacids containing magnesium or aluminum  ¨ Fleet or phospho soda enemas containing phosphorus  ¨ Certain stomach acid-blocking medicine such as cimetidine or ranitidine   ¨ Decongestants containing pseudoephedrine   ¨ Herbal supplements  Follow-up care  Follow up with your healthcare provider, or as advised. Contact one of the following for more information:  · American Association of Kidney Patients 877-906-4769 www.aakp.org  · National Kidney Foundation 202-477-0963 www.kidney.org  · American Kidney Fund 980-561-0576 www.kidneyfund.org  · National Kidney Disease Education Program 866-4KIDNEY www.nkdep.nih.gov  If an X-ray, ECG (cardiogram), or other diagnostic test was taken, you will be told of any new findings that may affect your care.  Call 911  Call 911 if you have any of the following:  · Severe weakness, dizziness, fainting, drowsiness, or confusion  · Chest pain or shortness of breath  · Heart beating fast, slow, or irregularly  When to seek medical advice  Call your healthcare provider right away  if any of these occur:  · Nausea or vomiting  · Fever of 100.4°F (38°C) or higher, or as directed by your healthcare provider  · Unexpected weight gain or swelling in the legs, ankles, or around the eyes  · Decrease or absent urine output  Date Last Reviewed: 9/1/2016  © 4430-2212 [employer]. [address] [place] [address], [place] [address]. All rights reserved. This information is not intended as a substitute for professional medical care. Always follow your healthcare professional's instructions.

## 2019-06-26 LAB — BACTERIA UR CULT: NO GROWTH

## 2019-06-27 ENCOUNTER — OFFICE VISIT (OUTPATIENT)
Dept: FAMILY MEDICINE | Facility: CLINIC | Age: 84
End: 2019-06-27
Payer: MEDICARE

## 2019-06-27 ENCOUNTER — LAB VISIT (OUTPATIENT)
Dept: LAB | Facility: HOSPITAL | Age: 84
End: 2019-06-27
Attending: NURSE PRACTITIONER
Payer: MEDICARE

## 2019-06-27 ENCOUNTER — TELEPHONE (OUTPATIENT)
Dept: UROLOGY | Facility: CLINIC | Age: 84
End: 2019-06-27

## 2019-06-27 VITALS
OXYGEN SATURATION: 95 % | HEIGHT: 74 IN | HEART RATE: 64 BPM | SYSTOLIC BLOOD PRESSURE: 108 MMHG | WEIGHT: 170.19 LBS | DIASTOLIC BLOOD PRESSURE: 80 MMHG | BODY MASS INDEX: 21.84 KG/M2 | TEMPERATURE: 98 F

## 2019-06-27 DIAGNOSIS — R42 DIZZINESS: ICD-10-CM

## 2019-06-27 DIAGNOSIS — R31.9 HEMATURIA, UNSPECIFIED TYPE: ICD-10-CM

## 2019-06-27 DIAGNOSIS — M79.672 ACUTE FOOT PAIN, LEFT: ICD-10-CM

## 2019-06-27 DIAGNOSIS — E87.5 HYPERKALEMIA: ICD-10-CM

## 2019-06-27 DIAGNOSIS — R53.1 GENERALIZED WEAKNESS: ICD-10-CM

## 2019-06-27 DIAGNOSIS — Z85.828 HISTORY OF SQUAMOUS CELL CARCINOMA OF SKIN: ICD-10-CM

## 2019-06-27 DIAGNOSIS — R42 DIZZINESS: Primary | ICD-10-CM

## 2019-06-27 DIAGNOSIS — N18.4 CKD (CHRONIC KIDNEY DISEASE) STAGE 4, GFR 15-29 ML/MIN: ICD-10-CM

## 2019-06-27 LAB
ANION GAP SERPL CALC-SCNC: 10 MMOL/L (ref 8–16)
BUN SERPL-MCNC: 26 MG/DL (ref 8–23)
CALCIUM SERPL-MCNC: 10.2 MG/DL (ref 8.7–10.5)
CHLORIDE SERPL-SCNC: 104 MMOL/L (ref 95–110)
CO2 SERPL-SCNC: 24 MMOL/L (ref 23–29)
CREAT SERPL-MCNC: 2 MG/DL (ref 0.5–1.4)
EST. GFR  (AFRICAN AMERICAN): 35 ML/MIN/1.73 M^2
EST. GFR  (NON AFRICAN AMERICAN): 30 ML/MIN/1.73 M^2
GLUCOSE SERPL-MCNC: 100 MG/DL (ref 70–110)
POTASSIUM SERPL-SCNC: 5.5 MMOL/L (ref 3.5–5.1)
SODIUM SERPL-SCNC: 138 MMOL/L (ref 136–145)

## 2019-06-27 PROCEDURE — 99999 PR PBB SHADOW E&M-EST. PATIENT-LVL V: CPT | Mod: PBBFAC,,, | Performed by: NURSE PRACTITIONER

## 2019-06-27 PROCEDURE — 36415 COLL VENOUS BLD VENIPUNCTURE: CPT | Mod: PO

## 2019-06-27 PROCEDURE — 99215 OFFICE O/P EST HI 40 MIN: CPT | Mod: PBBFAC,PO | Performed by: NURSE PRACTITIONER

## 2019-06-27 PROCEDURE — 80048 BASIC METABOLIC PNL TOTAL CA: CPT | Mod: PO

## 2019-06-27 PROCEDURE — 99499 RISK ADDL DX/OHS AUDIT: ICD-10-PCS | Mod: ,,, | Performed by: NURSE PRACTITIONER

## 2019-06-27 PROCEDURE — 99499 UNLISTED E&M SERVICE: CPT | Mod: ,,, | Performed by: NURSE PRACTITIONER

## 2019-06-27 PROCEDURE — 99214 PR OFFICE/OUTPT VISIT, EST, LEVL IV, 30-39 MIN: ICD-10-PCS | Mod: S$PBB,,, | Performed by: NURSE PRACTITIONER

## 2019-06-27 PROCEDURE — 99999 PR PBB SHADOW E&M-EST. PATIENT-LVL V: ICD-10-PCS | Mod: PBBFAC,,, | Performed by: NURSE PRACTITIONER

## 2019-06-27 PROCEDURE — 99499 UNLISTED E&M SERVICE: CPT | Mod: S$PBB,,, | Performed by: NURSE PRACTITIONER

## 2019-06-27 PROCEDURE — 99499 RISK ADDL DX/OHS AUDIT: ICD-10-PCS | Mod: S$PBB,,, | Performed by: NURSE PRACTITIONER

## 2019-06-27 PROCEDURE — 99214 OFFICE O/P EST MOD 30 MIN: CPT | Mod: S$PBB,,, | Performed by: NURSE PRACTITIONER

## 2019-06-27 NOTE — PROGRESS NOTES
"Subjective:       Patient ID: Farnaz Jarquin is a 83 y.o. male.    Chief Complaint: Fatigue    Patient who is known to me presents for follow up of dizziness. He is still dizzy. Has not started the antibiotic. He has not increased his water intake because it makes him urinate. He does not want to go to ER. Patient has a "bone spur" to his left heel that prevents him from placing pressure on the foot. This has been causing pain to ambulate. He would like a referral to dermatology for skin check.     Fatigue   This is a new problem. The current episode started more than 1 month ago. The problem occurs daily. The problem has been unchanged. Associated symptoms include fatigue, headaches (mild), urinary symptoms and weakness. Pertinent negatives include no abdominal pain, chest pain, chills, congestion, coughing, fever, joint swelling, nausea, numbness or sore throat. Nothing aggravates the symptoms. He has tried nothing for the symptoms.     Review of Systems   Constitutional: Positive for fatigue. Negative for chills and fever.   HENT: Negative for congestion, ear pain, sinus pressure and sore throat.    Respiratory: Negative for cough, shortness of breath and wheezing.    Cardiovascular: Negative for chest pain and leg swelling.   Gastrointestinal: Negative for abdominal distention, abdominal pain and nausea.   Genitourinary: Negative for dysuria and flank pain.   Musculoskeletal: Negative for joint swelling.   Skin: Negative for color change and pallor.   Neurological: Positive for dizziness, weakness and headaches (mild). Negative for light-headedness and numbness.       Objective:      Physical Exam   Constitutional: He is oriented to person, place, and time. He appears well-developed and well-nourished.   HENT:   Head: Normocephalic and atraumatic.   Right Ear: External ear normal.   Left Ear: External ear normal.   Eyes: Pupils are equal, round, and reactive to light. Conjunctivae and EOM are normal. "       Neck: Normal range of motion. Neck supple.   Cardiovascular: Normal rate and regular rhythm.   Pulmonary/Chest: Effort normal and breath sounds normal.   Abdominal: Soft. Bowel sounds are normal.   Musculoskeletal:        Left foot: There is decreased range of motion and tenderness.   Neurological: He is alert and oriented to person, place, and time. He has normal strength. He displays no tremor. He displays a negative Romberg sign. Gait abnormal.   No focal deficits no neuro exam. Patient AAOX3. Romberg negative.    Skin: Skin is warm and dry.   Nursing note and vitals reviewed.      Assessment:       1. Dizziness    2. Hematuria, unspecified type    3. History of squamous cell carcinoma of skin    4. Generalized weakness    5. CKD (chronic kidney disease) stage 4, GFR 15-29 ml/min    6. Hyperkalemia    7. Acute foot pain, left        Plan:       Dizziness  -     Cancel: CT Head Without Contrast; Future; Expected date: 06/27/2019  -     Cancel: X-Ray Chest PA And Lateral; Future; Expected date: 06/27/2019  -     Orthostatic blood pressure  -     Basic metabolic panel; Future; Expected date: 06/27/2019    Hematuria, unspecified type  -     US Retroperitoneal Complete; Future; Expected date: 06/27/2019    History of squamous cell carcinoma of skin  -     Ambulatory referral to Dermatology    Generalized weakness  -     Ambulatory Referral to Physical/Occupational Therapy    CKD (chronic kidney disease) stage 4, GFR 15-29 ml/min  Creatinine and GFR improved from last visit. Will repeat BMP next visit.   Hyperkalemia  Likely from kidney disease. Will repeat BMP next visit.  Acute foot pain, left  Discussed RICE therapy. Avoid NSAIDs, Advised tylenol only. Will consider xray in next visit.     Had patient drink 5 cups of water prior to leaving. Discussed with patient at length to increase water intake, avoid ETOH, and avoid NSAIDs. Patient denies taking alfuzosin, advised to d/c as SE is dizziness. Follow up next  week. Discussed s/s that warrant ER evaluation. Patient verbalized understanding.

## 2019-06-27 NOTE — PATIENT INSTRUCTIONS
Dehydration (Adult)  Dehydration occurs when your body loses too much fluid. This may be the result of prolonged vomiting or diarrhea, excessive sweating, or a high fever. It may also happen if you dont drink enough fluid when youre sick or out in the heat. Misuse of diuretics (water pills) can also be a cause.  Symptoms include thirst and decreased urine output. You may also feel dizzy, weak, fatigued, or very drowsy. The diet described below is usually enough to treat dehydration. In some cases, you may need medicine.  Home care  · Drink at least 12 8-ounce glasses of fluid every day to resolve the dehydration. Fluid may include water; orange juice; lemonade; apple, grape, or cranberry juice; clear fruit drinks; electrolyte replacement and sports drinks; and teas and coffee without caffeine. If you have been diagnosed with a kidney disease, ask your doctor how much and what types of fluids you should drink to prevent dehydration. If you have kidney disease, fluid can build up in the body. This can be dangerous to your health.  · If you have a fever, muscle aches, or a headache as a result of a cold or flu, you may take acetaminophen or ibuprofen, unless another medicine was prescribed. If you have chronic liver or kidney disease, or have ever had a stomach ulcer or gastrointestinal bleeding, talk with your health care provider before using these medicines. Don't take aspirin if you are younger than 18 and have a fever. Aspirin raises the chance for severe liver injury.  Follow-up care  Follow up with your health care provider, or as advised.  When to seek medical advice  Call your health care provider right away if any of these occur:  · Continued vomiting  · Frequent diarrhea (more than 5 times a day); blood (red or black color) or mucus in diarrhea  · Blood in vomit or stool  · Swollen abdomen or increasing abdominal pain  · Weakness, dizziness, or fainting  · Unusual drowsiness or confusion  · Reduced urine  output or extreme thirst  · Fever of 100.4°F (34°C) or higher  Date Last Reviewed: 5/31/2015  © 8100-0687 [employer]. [address] [place] [address], [place] [address]. All rights reserved. This information is not intended as a substitute for professional medical care. Always follow your healthcare professional's instructions.

## 2019-06-27 NOTE — TELEPHONE ENCOUNTER
----- Message from Ernestina Fitch MA sent at 6/27/2019  9:49 AM CDT -----  Type:  Sooner Apoointment Request    Caller is requesting a sooner appointment.  Caller declined first available appointment listed below.  Caller will not accept being placed on the waitlist and is requesting a message be sent to doctor.    Name of Caller:  Patient  When is the first available appointment?  August 2019  Symptoms:  Follow up per VIOLETA Aguilar  Best Call Back Number:  [phone] (home)     Additional Information:  Patient advised per VIOLETA Aguilar to follow up Nephrology soon as possible, please contact to advise thank you

## 2019-06-28 ENCOUNTER — OFFICE VISIT (OUTPATIENT)
Dept: NEPHROLOGY | Facility: CLINIC | Age: 84
End: 2019-06-28
Payer: MEDICARE

## 2019-06-28 VITALS
DIASTOLIC BLOOD PRESSURE: 82 MMHG | BODY MASS INDEX: 21.73 KG/M2 | HEIGHT: 74 IN | OXYGEN SATURATION: 99 % | HEART RATE: 57 BPM | SYSTOLIC BLOOD PRESSURE: 128 MMHG | WEIGHT: 169.31 LBS

## 2019-06-28 DIAGNOSIS — N20.0 CALCIUM OXALATE KIDNEY STONES: ICD-10-CM

## 2019-06-28 DIAGNOSIS — E87.5 HYPERKALEMIA: ICD-10-CM

## 2019-06-28 DIAGNOSIS — N18.4 CKD (CHRONIC KIDNEY DISEASE) STAGE 4, GFR 15-29 ML/MIN: Primary | ICD-10-CM

## 2019-06-28 PROCEDURE — 99214 OFFICE O/P EST MOD 30 MIN: CPT | Mod: S$PBB,,, | Performed by: INTERNAL MEDICINE

## 2019-06-28 PROCEDURE — 99214 PR OFFICE/OUTPT VISIT, EST, LEVL IV, 30-39 MIN: ICD-10-PCS | Mod: S$PBB,,, | Performed by: INTERNAL MEDICINE

## 2019-06-28 PROCEDURE — 99999 PR PBB SHADOW E&M-EST. PATIENT-LVL III: ICD-10-PCS | Mod: PBBFAC,,, | Performed by: INTERNAL MEDICINE

## 2019-06-28 PROCEDURE — 99213 OFFICE O/P EST LOW 20 MIN: CPT | Mod: PBBFAC,PO | Performed by: INTERNAL MEDICINE

## 2019-06-28 PROCEDURE — 99999 PR PBB SHADOW E&M-EST. PATIENT-LVL III: CPT | Mod: PBBFAC,,, | Performed by: INTERNAL MEDICINE

## 2019-06-28 RX ORDER — MECLIZINE HCL 12.5 MG 12.5 MG/1
12.5 TABLET ORAL 3 TIMES DAILY PRN
Qty: 30 TABLET | Refills: 0 | Status: SHIPPED | OUTPATIENT
Start: 2019-06-28 | End: 2020-08-05

## 2019-06-29 NOTE — PROGRESS NOTES
Please call the patient and let him know that his blood work is showing an increase in potassium. I want him to continue to drink plenty of water and I am sending him in some meclizine for the dizziness. I will see him at his follow up appointment.

## 2019-07-03 ENCOUNTER — OFFICE VISIT (OUTPATIENT)
Dept: FAMILY MEDICINE | Facility: CLINIC | Age: 84
End: 2019-07-03
Payer: MEDICARE

## 2019-07-03 ENCOUNTER — HOSPITAL ENCOUNTER (OUTPATIENT)
Dept: RADIOLOGY | Facility: HOSPITAL | Age: 84
Discharge: HOME | End: 2019-07-03
Attending: NURSE PRACTITIONER
Payer: MEDICARE

## 2019-07-03 VITALS
OXYGEN SATURATION: 97 % | BODY MASS INDEX: 21.62 KG/M2 | DIASTOLIC BLOOD PRESSURE: 60 MMHG | TEMPERATURE: 98 F | WEIGHT: 168.44 LBS | HEART RATE: 60 BPM | SYSTOLIC BLOOD PRESSURE: 86 MMHG | HEIGHT: 74 IN

## 2019-07-03 DIAGNOSIS — R31.9 HEMATURIA, UNSPECIFIED TYPE: ICD-10-CM

## 2019-07-03 DIAGNOSIS — R03.1 LOW BLOOD PRESSURE READING: Primary | ICD-10-CM

## 2019-07-03 DIAGNOSIS — R42 DIZZINESS: ICD-10-CM

## 2019-07-03 PROCEDURE — 99213 OFFICE O/P EST LOW 20 MIN: CPT | Mod: S$PBB,,, | Performed by: NURSE PRACTITIONER

## 2019-07-03 PROCEDURE — 99213 PR OFFICE/OUTPT VISIT, EST, LEVL III, 20-29 MIN: ICD-10-PCS | Mod: S$PBB,,, | Performed by: NURSE PRACTITIONER

## 2019-07-03 PROCEDURE — 99999 PR PBB SHADOW E&M-EST. PATIENT-LVL IV: CPT | Mod: PBBFAC,,, | Performed by: NURSE PRACTITIONER

## 2019-07-03 PROCEDURE — 99214 OFFICE O/P EST MOD 30 MIN: CPT | Mod: PBBFAC,PO | Performed by: NURSE PRACTITIONER

## 2019-07-03 PROCEDURE — 99999 PR PBB SHADOW E&M-EST. PATIENT-LVL IV: ICD-10-PCS | Mod: PBBFAC,,, | Performed by: NURSE PRACTITIONER

## 2019-07-03 NOTE — PROGRESS NOTES
"Subjective:       Patient ID: Farnaz Jarquin is a 83 y.o. male.    Chief Complaint: Follow-up    Patient who is known to me presents for a follow up of dizziness. He has increased his fluid intake "a little bit." He was recently seen by the nephrologist and told to drink more water. He has not drank any ETOH. He keeps a small bottle of water and sips from it. He states he is more dizzy today. He has not tried the meclizine and he has one tablet left of the doxycyline. He takes care of his wife at home and endorses stress from that situation. He has a renal ultrasound scheduled today to evaluate his hematuria. He attributes his dizziness to "getting old." He denies any new weakness, HA, or confusion.     Review of Systems   Constitutional: Negative for chills, fatigue and fever.   HENT: Negative for congestion, ear pain, sinus pressure and sore throat.    Respiratory: Negative for shortness of breath and wheezing.    Cardiovascular: Negative for chest pain and leg swelling.   Gastrointestinal: Negative for abdominal distention and abdominal pain.   Genitourinary: Negative for dysuria and flank pain.   Skin: Negative for color change and pallor.   Neurological: Negative for light-headedness, numbness and headaches.       Objective:      Physical Exam   Constitutional: He is oriented to person, place, and time. He appears well-developed and well-nourished.   HENT:   Head: Normocephalic and atraumatic.   Right Ear: External ear normal.   Left Ear: External ear normal.   Eyes: Pupils are equal, round, and reactive to light. Conjunctivae and EOM are normal.   Neck: Normal range of motion. Neck supple.   Cardiovascular: Normal rate and regular rhythm.   Pulmonary/Chest: Effort normal and breath sounds normal.   Abdominal: Soft. Bowel sounds are normal.   Musculoskeletal: Normal range of motion.   Neurological: He is alert and oriented to person, place, and time.   Skin: Skin is warm and dry.   Nursing note and vitals " reviewed.      Assessment:       1. Low blood pressure reading    2. Dizziness        Plan:       Low blood pressure reading    Dizziness      Patient with BP reading of 86/60 and 92/60 will send to ER for evaluation and possible fluid replacement. Discussed plan with patient and he is in agreement. He refused EMS and want to go via personal vehicle. Discussed risk of going via personal vehicle. Patient verbalized understanding.

## 2019-07-08 NOTE — PROGRESS NOTES
Subjective:       Patient ID: Farnaz Jarquin is a 83 y.o. White male who presents for follow-up evaluation of Chronic Kidney Disease    HPI     March 2018: He reports he is doing well. No edema nor SOB. His appetite is good and weight is stable. No new medications since last visit. He denies LUTS since being on Uroxatral    June 2019: Very overdue for follow up. Sent here for increased sCr but also taking NSAIDs. He saw PCP for feelng poorly in general: malaise, tired, also woozy. And sometimes ?faint. He saw PCP who did labs, u/a and started ABX for presumed UTI. He feels a tad better. He is mostly nervous about his kidneys and is also a nervous gentleman in general.       Review of Systems   Constitutional: Negative for appetite change, fever and unexpected weight change.   Respiratory: Negative for shortness of breath.    Cardiovascular: Negative for chest pain and leg swelling.   Gastrointestinal: Negative for constipation and diarrhea.   Genitourinary: Negative for difficulty urinating, flank pain and hematuria.   Musculoskeletal: Positive for arthralgias and back pain.   Neurological: Negative for weakness and headaches.       Objective:      Physical Exam   Constitutional: He is oriented to person, place, and time. He appears well-developed and well-nourished. No distress.   Neck: No JVD present.   Cardiovascular: S1 normal and S2 normal. Exam reveals no friction rub.   Pulmonary/Chest: Breath sounds normal. He has no wheezes. He has no rales.   Abdominal: Soft.   Musculoskeletal: He exhibits no edema.   Neurological: He is alert and oriented to person, place, and time.   Skin: Skin is warm and dry.   Psychiatric: He has a normal mood and affect.   Vitals reviewed.      Assessment:       1. 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 Calcium oxalate kidney stones    3. Hyperkalemia on low potassium diet        Plan:             CKD Stage 3/4 with stable kidney function--reassured him that his kidney  function is not much worse than prior but he needs to stop NSAIDs    ?UTI--continue ABX    Woozy/Faint--Advised to hold Uroxatral and see if symptoms improve    He already has a renal u/s scheduled and has follow up with PCP next week. From a Nephrology standpoint I will follow up on renal u/s. Pt needs continued follow up for further evaluation. He was advised if no improvement tomorrow he needs to see urgent care/ER        RTC 4 months with labs prior

## 2019-07-09 PROBLEM — Z74.09 IMPAIRED FUNCTIONAL MOBILITY, BALANCE, GAIT, AND ENDURANCE: Status: ACTIVE | Noted: 2019-07-09

## 2019-07-09 PROBLEM — R53.1 WEAKNESS: Status: ACTIVE | Noted: 2019-07-09

## 2019-07-15 ENCOUNTER — OFFICE VISIT (OUTPATIENT)
Dept: DERMATOLOGY | Facility: CLINIC | Age: 84
End: 2019-07-15
Payer: MEDICARE

## 2019-07-15 VITALS — BODY MASS INDEX: 22.46 KG/M2 | WEIGHT: 175 LBS | HEIGHT: 74 IN

## 2019-07-15 DIAGNOSIS — D48.5 NEOPLASM OF UNCERTAIN BEHAVIOR OF SKIN: ICD-10-CM

## 2019-07-15 DIAGNOSIS — L82.1 SEBORRHEIC KERATOSIS: Primary | ICD-10-CM

## 2019-07-15 DIAGNOSIS — Z85.828 PERSONAL HISTORY OF MALIGNANT NEOPLASM OF SKIN: ICD-10-CM

## 2019-07-15 PROCEDURE — 99213 OFFICE O/P EST LOW 20 MIN: CPT | Mod: 25,S$PBB,, | Performed by: DERMATOLOGY

## 2019-07-15 PROCEDURE — 99213 PR OFFICE/OUTPT VISIT, EST, LEVL III, 20-29 MIN: ICD-10-PCS | Mod: 25,S$PBB,, | Performed by: DERMATOLOGY

## 2019-07-15 PROCEDURE — 88305 TISSUE SPECIMEN TO PATHOLOGY, DERMATOLOGY: ICD-10-PCS | Mod: 26,,, | Performed by: PATHOLOGY

## 2019-07-15 PROCEDURE — 11102 TANGNTL BX SKIN SINGLE LES: CPT | Mod: PBBFAC,PO | Performed by: DERMATOLOGY

## 2019-07-15 PROCEDURE — 11102 PR TANGENTIAL BIOPSY, SKIN, SINGLE LESION: ICD-10-PCS | Mod: S$PBB,,, | Performed by: DERMATOLOGY

## 2019-07-15 PROCEDURE — 99999 PR PBB SHADOW E&M-EST. PATIENT-LVL III: ICD-10-PCS | Mod: PBBFAC,,, | Performed by: DERMATOLOGY

## 2019-07-15 PROCEDURE — 99999 PR PBB SHADOW E&M-EST. PATIENT-LVL III: CPT | Mod: PBBFAC,,, | Performed by: DERMATOLOGY

## 2019-07-15 PROCEDURE — 99213 OFFICE O/P EST LOW 20 MIN: CPT | Mod: PBBFAC,PO | Performed by: DERMATOLOGY

## 2019-07-15 PROCEDURE — 11102 TANGNTL BX SKIN SINGLE LES: CPT | Mod: S$PBB,,, | Performed by: DERMATOLOGY

## 2019-07-15 PROCEDURE — 88305 TISSUE EXAM BY PATHOLOGIST: CPT | Performed by: PATHOLOGY

## 2019-07-15 NOTE — LETTER
July 15, 2019      Chelsea Aguilar, VIOLETA  [address] [facility] [address] [place] [address]           [facility]  [address] [facility] [address] [place] [address]  Phone: [phone]  Fax: [phone]          Patient: Farnaz Jarquin   MR Number: [id number]   YOB: 1935   Date of Visit: 7/15/2019       Dear Chelsea Aguilar:    Thank you for referring Farnaz Jarquin to me for evaluation. Attached you will find relevant portions of my assessment and plan of care.    If you have questions, please do not hesitate to call me. I look forward to following Farnaz Jarquin along with you.    Sincerely,    Saundra Barton MD    Enclosure  CC:  No Recipients    If you would like to receive this communication electronically, please contact [email or web address] or [phone] to request more information on [employer] Link access.    For providers and/or their staff who would like to refer a patient to [facility], please contact us through our one-stop-shop provider referral line, [facility], at [phone].    If you feel you have received this communication in error or would no longer like to receive these types of communications, please e-mail [email or web address]

## 2019-07-15 NOTE — PROGRESS NOTES
Subjective:       Patient ID:  Farnaz Jarquin is a 83 y.o. male who presents for   Chief Complaint   Patient presents with    Lesion     84 y/o M presents for evaluation of a lesion on left hand x few weeks, swollen, tender. He denies bleeding.  No prior tx.  There are a few rough brown lesions on his neck/chest that he would like to have examined as well    Derm Hx: SCC on R forearm treated by Dr Pizarro  Denies family h/o melanoma.     .  Past Medical History:   Diagnosis Date    Arthritis     Cataract     Done OU    CKD (chronic kidney disease) stage 3, GFR 30-59 ml/min     Glaucoma     Kidney stone     Urinary tract infection      Review of Systems   Skin: Positive for dry skin and wears hat. Negative for itching, rash, daily sunscreen use and activity-related sunscreen use.   Hematologic/Lymphatic: Does not bruise/bleed easily.   Allergic/Immunologic: Negative for environmental allergies.        Objective:    Physical Exam   Constitutional: He appears well-developed and well-nourished.   Neurological: He is alert and oriented to person, place, and time.   Psychiatric: He has a normal mood and affect.   Skin:   Areas Examined (abnormalities noted in diagram):   Head / Face Inspection Performed  Neck Inspection Performed  RUE Inspected  LUE Inspection Performed                  Diagram Legend     Erythematous scaling macule/papule c/w actinic keratosis       Vascular papule c/w angioma      Pigmented verrucoid papule/plaque c/w seborrheic keratosis      Yellow umbilicated papule c/w sebaceous hyperplasia      Irregularly shaped tan macule c/w lentigo     1-2 mm smooth white papules consistent with Milia      Movable subcutaneous cyst with punctum c/w epidermal inclusion cyst      Subcutaneous movable cyst c/w pilar cyst      Firm pink to brown papule c/w dermatofibroma      Pedunculated fleshy papule(s) c/w skin tag(s)      Evenly pigmented macule c/w junctional nevus     Mildly variegated  pigmented, slightly irregular-bordered macule c/w mildly atypical nevus      Flesh colored to evenly pigmented papule c/w intradermal nevus       Pink pearly papule/plaque c/w basal cell carcinoma      Erythematous hyperkeratotic cursted plaque c/w SCC      Surgical scar with no sign of skin cancer recurrence      Open and closed comedones      Inflammatory papules and pustules      Verrucoid papule consistent consistent with wart     Erythematous eczematous patches and plaques     Dystrophic onycholytic nail with subungual debris c/w onychomycosis     Umbilicated papule    Erythematous-base heme-crusted tan verrucoid plaque consistent with inflamed seborrheic keratosis     Erythematous Silvery Scaling Plaque c/w Psoriasis     See annotation      Assessment / Plan:      Pathology Orders:     Normal Orders This Visit    Tissue Specimen To Pathology, Dermatology     Questions:    Directional Terms:  Other(comment)    Clinical Information:  r/o HAK vs SCC vs other    Specific Site:  L dorsal hand        Neoplasm of uncertain behavior of skin  -     Tissue Specimen To Pathology, Dermatology  Shave biopsy procedure note:    Shave biopsy performed after verbal consent including risk of infection, scar, recurrence, need for additional treatment of site. Area prepped with alcohol, anesthetized with approximately 1.0cc of 1% lidocaine with epinephrine. Lesional tissue shaved with razor blade. Hemostasis achieved with application of aluminum chloride followed by hyfrecation. No complications. Dressing applied. Wound care explained.    Seborrheic keratosis  These are benign inherited growths without a malignant potential. Reassurance given to patient. No treatment is necessary.     Personal history of malignant neoplasm of skin  No evidence of recurrence           Follow up for pending Pathology.

## 2019-07-24 ENCOUNTER — TELEPHONE (OUTPATIENT)
Dept: DERMATOLOGY | Facility: CLINIC | Age: 84
End: 2019-07-24

## 2019-07-24 NOTE — TELEPHONE ENCOUNTER
Mr Osei contacted w/ result , appointment scheduled & mailed.     ----- Message from Saundra Barton MD sent at 7/22/2019  5:56 PM CDT -----  Skin, left dorsal hand, shave biopsy:  - INVASIVE SQUAMOUS CELL CARCINOMA, WELL-DIFFERENTIATED.  - MARGINS ARE NEGATIVE IN THE PLANES OF SECTION    Please let him know that the biopsy showed SCC but looks like it is completely removed on biopsy.  I would like to see him back in clinic to look at biopsy site to make sure we don't need to do any further treatment  15 min appt ok  Thanks  GAVINO

## 2019-08-01 ENCOUNTER — TELEPHONE (OUTPATIENT)
Dept: NEPHROLOGY | Facility: CLINIC | Age: 84
End: 2019-08-01

## 2019-08-01 DIAGNOSIS — N18.4 CKD (CHRONIC KIDNEY DISEASE) STAGE 4, GFR 15-29 ML/MIN: Primary | ICD-10-CM

## 2019-08-01 NOTE — TELEPHONE ENCOUNTER
----- Message from Chhaya Waterman LPN sent at 7/23/2019 11:06 AM CDT -----  Regarding: need labs for follow up in 4m  .

## 2019-08-05 NOTE — TELEPHONE ENCOUNTER
OK.  It looks like his appt is for November 26.  Let us know when orders are in and we will schedule appropriately.

## 2019-08-06 ENCOUNTER — OFFICE VISIT (OUTPATIENT)
Dept: DERMATOLOGY | Facility: CLINIC | Age: 84
End: 2019-08-06
Payer: MEDICARE

## 2019-08-06 VITALS — WEIGHT: 175.06 LBS | HEIGHT: 74 IN | BODY MASS INDEX: 22.47 KG/M2

## 2019-08-06 DIAGNOSIS — C44.629: Primary | ICD-10-CM

## 2019-08-06 PROCEDURE — 99213 OFFICE O/P EST LOW 20 MIN: CPT | Mod: PBBFAC,PO | Performed by: DERMATOLOGY

## 2019-08-06 PROCEDURE — 99213 OFFICE O/P EST LOW 20 MIN: CPT | Mod: S$PBB,,, | Performed by: DERMATOLOGY

## 2019-08-06 PROCEDURE — 99999 PR PBB SHADOW E&M-EST. PATIENT-LVL III: CPT | Mod: PBBFAC,,, | Performed by: DERMATOLOGY

## 2019-08-06 PROCEDURE — 99213 PR OFFICE/OUTPT VISIT, EST, LEVL III, 20-29 MIN: ICD-10-PCS | Mod: S$PBB,,, | Performed by: DERMATOLOGY

## 2019-08-06 PROCEDURE — 99999 PR PBB SHADOW E&M-EST. PATIENT-LVL III: ICD-10-PCS | Mod: PBBFAC,,, | Performed by: DERMATOLOGY

## 2019-08-06 NOTE — PROGRESS NOTES
Subjective:       Patient ID:  Farnaz Jarquin is a 83 y.o. male who presents for   Chief Complaint   Patient presents with    Follow-up     Reevaluate L dorsal hand, s/p SCC proven bx     84 yo M presents for follow up and discussion of biopsy results.  Last OV 7- at which time a lesion on his L hand was biopsied.  Bx showed SCC, margins negative. The site has some residual scaly texture.    Skin, left dorsal hand, shave biopsy:  - INVASIVE SQUAMOUS CELL CARCINOMA, WELL-DIFFERENTIATED.  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 Negative for daily sunscreen use and activity-related sunscreen use.   Hematologic/Lymphatic: Does not bruise/bleed easily.   Allergic/Immunologic: Negative for environmental allergies.        Objective:    Physical Exam   Constitutional: He appears well-developed and well-nourished.   Neurological: He is alert and oriented to person, place, and time.   Psychiatric: He has a normal mood and affect.   Skin:   Areas Examined (abnormalities noted in diagram):   Head / Face Inspection Performed  Neck Inspection Performed  RUE Inspected  LUE Inspection Performed                 Diagram Legend     Erythematous scaling macule/papule c/w actinic keratosis       Vascular papule c/w angioma      Pigmented verrucoid papule/plaque c/w seborrheic keratosis      Yellow umbilicated papule c/w sebaceous hyperplasia      Irregularly shaped tan macule c/w lentigo     1-2 mm smooth white papules consistent  with Milia      Movable subcutaneous cyst with punctum c/w epidermal inclusion cyst      Subcutaneous movable cyst c/w pilar cyst      Firm pink to brown papule c/w dermatofibroma      Pedunculated fleshy papule(s) c/w skin tag(s)      Evenly pigmented macule c/w junctional nevus     Mildly variegated pigmented, slightly irregular-bordered macule c/w mildly atypical nevus      Flesh colored to evenly pigmented papule c/w intradermal nevus       Pink pearly papule/plaque c/w basal cell carcinoma      Erythematous hyperkeratotic cursted plaque c/w SCC      Surgical scar with no sign of skin cancer recurrence      Open and closed comedones      Inflammatory papules and pustules      Verrucoid papule consistent consistent with wart     Erythematous eczematous patches and plaques     Dystrophic onycholytic nail with subungual debris c/w onychomycosis     Umbilicated papule    Erythematous-base heme-crusted tan verrucoid plaque consistent with inflamed seborrheic keratosis     Erythematous Silvery Scaling Plaque c/w Psoriasis     See annotation      Assessment / Plan:        Squamous cell carcinoma of skin of left hand    Despite the margins being negative on biopsy, there is clinical evidence of recurrence/residual SCC.  I recommended that he see Dr Landrum to discuss Mohs due to the location.  Pt agreed with this plan         Follow up for as scheduled for Mohs consult then skin check with me in 3 months.

## 2019-08-08 ENCOUNTER — INITIAL CONSULT (OUTPATIENT)
Dept: DERMATOLOGY | Facility: CLINIC | Age: 84
End: 2019-08-08
Payer: MEDICARE

## 2019-08-08 VITALS
DIASTOLIC BLOOD PRESSURE: 74 MMHG | BODY MASS INDEX: 22.46 KG/M2 | HEIGHT: 74 IN | SYSTOLIC BLOOD PRESSURE: 117 MMHG | WEIGHT: 175 LBS | HEART RATE: 74 BPM

## 2019-08-08 DIAGNOSIS — C44.629 SQUAMOUS CELL CARCINOMA OF HAND, LEFT: Primary | ICD-10-CM

## 2019-08-08 PROCEDURE — 99999 PR PBB SHADOW E&M-EST. PATIENT-LVL III: CPT | Mod: PBBFAC,,, | Performed by: DERMATOLOGY

## 2019-08-08 PROCEDURE — 99214 PR OFFICE/OUTPT VISIT, EST, LEVL IV, 30-39 MIN: ICD-10-PCS | Mod: S$PBB,,, | Performed by: DERMATOLOGY

## 2019-08-08 PROCEDURE — 99999 PR PBB SHADOW E&M-EST. PATIENT-LVL III: ICD-10-PCS | Mod: PBBFAC,,, | Performed by: DERMATOLOGY

## 2019-08-08 PROCEDURE — 99214 OFFICE O/P EST MOD 30 MIN: CPT | Mod: S$PBB,,, | Performed by: DERMATOLOGY

## 2019-08-08 PROCEDURE — 99213 OFFICE O/P EST LOW 20 MIN: CPT | Mod: PBBFAC,PO | Performed by: DERMATOLOGY

## 2019-08-08 NOTE — LETTER
August 8, 2019      Saundra Barton MD  [address] [facility] [place] [address]           [facility]  [address] [facility] [place] [address]  Phone: [phone]          Patient: Farnaz Jarquin   MR Number: [id number]   YOB: 1935   Date of Visit: 8/8/2019       Dear Dr. Saundra Barton:    Thank you for referring Farnaz Jarquin to me for evaluation. Attached you will find relevant portions of my assessment and plan of care.    If you have questions, please do not hesitate to call me. I look forward to following Farnaz Jarquin along with you.    Sincerely,    Luis Landrum MD    Enclosure  CC:  No Recipients    If you would like to receive this communication electronically, please contact [email or web address] or [phone] to request more information on [employer] Link access.    For providers and/or their staff who would like to refer a patient to [facility], please contact us through our one-stop-shop provider referral line, [facility] , at [phone].    If you feel you have received this communication in error or would no longer like to receive these types of communications, please e-mail [email or web address]
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
## 2019-08-09 ENCOUNTER — PROCEDURE VISIT (OUTPATIENT)
Dept: DERMATOLOGY | Facility: CLINIC | Age: 84
End: 2019-08-09
Payer: MEDICARE

## 2019-08-09 VITALS
WEIGHT: 175 LBS | HEIGHT: 74 IN | DIASTOLIC BLOOD PRESSURE: 75 MMHG | SYSTOLIC BLOOD PRESSURE: 113 MMHG | BODY MASS INDEX: 22.46 KG/M2 | HEART RATE: 67 BPM

## 2019-08-09 DIAGNOSIS — C44.629 SQUAMOUS CELL CARCINOMA OF HAND, LEFT: Primary | ICD-10-CM

## 2019-08-09 PROCEDURE — 17311: ICD-10-PCS | Mod: S$PBB,,, | Performed by: DERMATOLOGY

## 2019-08-09 PROCEDURE — 17311 MOHS 1 STAGE H/N/HF/G: CPT | Mod: S$PBB,,, | Performed by: DERMATOLOGY

## 2019-08-09 PROCEDURE — 99499 NO LOS: ICD-10-PCS | Mod: S$PBB,,, | Performed by: DERMATOLOGY

## 2019-08-09 PROCEDURE — 17311 MOHS 1 STAGE H/N/HF/G: CPT | Mod: PBBFAC,PO | Performed by: DERMATOLOGY

## 2019-08-09 PROCEDURE — 99499 UNLISTED E&M SERVICE: CPT | Mod: S$PBB,,, | Performed by: DERMATOLOGY

## 2019-08-09 NOTE — PROGRESS NOTES
Prior photo(s) of site(s) to confirm location(s):        ALLERGIES:   Patient has no known allergies.      Current Outpatient Medications:     alfuzosin (UROXATRAL) 10 mg Tb24, Take 1 tablet (10 mg total) by mouth daily with dinner or evening meal., Disp: 30 tablet, Rfl: 12    brimonidine-timolol (COMBIGAN) 0.2-0.5 % Drop, INSTILL 1 DROP INTO THE LEFT EYE TWO TIMES A DAY, Disp: 10 mL, Rfl: 11    CARBOXYMETHYLCELLULOSE SODIUM (LUBRICANT EYE DROPS OPHT), Apply to eye., Disp: , Rfl:     meclizine (ANTIVERT) 12.5 mg tablet, Take 1 tablet (12.5 mg total) by mouth 3 (three) times daily as needed for Dizziness., Disp: 30 tablet, Rfl: 0    multivitamin capsule, Take 1 capsule by mouth once daily., Disp: , Rfl:   -------------------------------------------------------------  PROCEDURE: Mohs' Micrographic Surgery    SITE: left dorsal hand    INDICATION: squamous cell carcinoma in an area at increased risk of recurrence    CASE NUMBER: [id number]      ANESTHETIC: 1.5 mL 1% Lidocaine with Epinephrine 1:100,000    SURGICAL PREP: Ethanol and Hibiclens    SURGEON: Luis Landrum MD    ASSISTANTS: Walter Boyer CST     PREOPERATIVE DIAGNOSIS: squamous cell carcinoma     POSTOPERATIVE DIAGNOSIS: squamous cell carcinoma     PATHOLOGIC DIAGNOSIS: squamous cell carcinoma     STAGES OF MOHS' SURGERY PERFORMED: one    TUMOR-FREE PLANE ACHIEVED: yes    HEMOSTASIS: Hyfrecation     SPECIMENS: one (one in stage A)    INITIAL LESION SIZE: 1.0 x 1.1 cm    FINAL DEFECT SIZE: 1.1 x 1.5 cm    WOUND REPAIR/DISPOSITION: see below    NARRATIVE:    The patient is a 83 y.o.male referred by Saundra Barton MD with a history of cancer on the left dorsal hand which was biopsied - pathology accession #[id number], [facility]. Findings revealed squamous cell carcinoma. Examination revealed a raised, pink, prurigo-like nodule on the left dorsal hand at the site of prior biopsy, which was confirmed by reference to the photograph taken at the  "previous patient visit, as attached above. In light of the nature of this tumor and the location on the hand, Mohs' micrographic surgery was thought to be the most appropriate management choice, and this diagnosis is appropriate for treatment by Mohs' micrographic surgery.  I discussed it with the patient and he fully understands the aims, risks, alternatives, and possible complications, and elects to proceed.  There are no medical or surgical contraindications to the procedure.     A signed informed consent was obtained.    PROCEDURE:  The patient was placed in the semi-recumbent position on the operating table in the Mohs' Surgery Suite with his arm resting on a arm board. The area in question was thoroughly prepped with ethanol and Hibiclens. A sterile surgical marker was used to outline the clinically apparent margins of the involved area, and a narrow margin of normal-appearing skin. Reference marks were made at the periphery of the outlined area with the surgical marker. The proposed area of excision was measured and photographed. Local anesthesia as noted above was administered.  The total volume of anesthetic used throughout this portion of the procedure was as documented above. The area was prepared and draped in the standard manner. All of the grossly identifiable area of clinically abnormal tissue and an underlying/peripheral layer was taken and processed by the Mohs' technique.  Hemostasis was obtained with the hyfrecator. Tissue was taken from any areas of residual marginal involvement (if present) and processed by the Mohs' technique in as many stages as needed until a tumor-free plane was achieved.    Colors of inks used in the reference nicks at epidermal margins (if present) and/or inking of non-epithelial edges, if applicable, is represented on the Mohs map as follows: solid lines represent red ink, dots represent blue ink, jagged lines represent black ink, curlicues represent green ink, "xxx" " represents yellow ink.    The first Mohs' layer consisted of one section(s) with 6 slide(s) evaluated. No residual tumor was noted at the margins of the first Mohs' layer. Histology of the specimen(s) showed changes consistent with chronic solar damage, and an area of seborrheic keratosis to the margin near the green nick, as noted on the Mohs map.     A total of one section(s) and 6 slide(s) were examined under the microscope via the Mohs technique.  A cancer free plane was reached after layer number one. Defect final size was as noted above.      The wound was covered with a nonadherent dressing between stages, and the patient allowed to wait in the waiting area during these periods. The final defect was photographed at the completion of the Mohs' procedure.    The patient was returned to the procedure room following completion of the Mohs' procedure and final slide review. After reviewing the risk and benefits of the alternatives for management of the defect, he and I have decided to allow the site to heal by secondary intention.    Final dressing consisted of petrolatum, Telfa and tape.    Estimated blood loss for the total procedure was less than 55 mL.    Total operative time including tissue processing in the Mohs' laboratory and microscopic Mohs' frozen section slide review was 1 hour(s). Verbal and written wound care instructions were given to the patient, and he expressed understanding of these instructions. The patient tolerated the procedure well and left the operating room in good condition; he is to return in 14 days for followup.     Dr. Landrum's cell phone number was given to the patient with instructions to call prn with any problems.

## 2019-08-22 ENCOUNTER — OFFICE VISIT (OUTPATIENT)
Dept: DERMATOLOGY | Facility: CLINIC | Age: 84
End: 2019-08-22
Payer: MEDICARE

## 2019-08-22 DIAGNOSIS — Z85.828 HISTORY OF MOH'S MICROGRAPHIC SURGERY FOR SKIN CANCER: Primary | ICD-10-CM

## 2019-08-22 DIAGNOSIS — Z98.890 HISTORY OF MOH'S MICROGRAPHIC SURGERY FOR SKIN CANCER: Primary | ICD-10-CM

## 2019-08-22 PROCEDURE — 99024 POSTOP FOLLOW-UP VISIT: CPT | Mod: POP,,, | Performed by: DERMATOLOGY

## 2019-08-22 PROCEDURE — 99999 PR PBB SHADOW E&M-EST. PATIENT-LVL I: ICD-10-PCS | Mod: PBBFAC,,, | Performed by: DERMATOLOGY

## 2019-08-22 PROCEDURE — 99999 PR PBB SHADOW E&M-EST. PATIENT-LVL I: CPT | Mod: PBBFAC,,, | Performed by: DERMATOLOGY

## 2019-08-22 PROCEDURE — 99024 PR POST-OP FOLLOW-UP VISIT: ICD-10-PCS | Mod: POP,,, | Performed by: DERMATOLOGY

## 2019-08-22 PROCEDURE — 99211 OFF/OP EST MAY X REQ PHY/QHP: CPT | Mod: PBBFAC,PO | Performed by: DERMATOLOGY

## 2019-08-22 NOTE — PROGRESS NOTES
CC: 83 y.o.male patient is here for followup     HPI: Patient is 2 week(s) s/p Mohs' micrographic surgery, fresh tissue technique, of a squamous cell carcinoma on the left dorsal hand; with healing via secondary intention  Patient reports no problems    EXAM: Wound appears to be healing well. Base shows good granulation.  No undue erythema to surrounding skin or signs or symptoms of infection.    IMPRESSION:  Healing well post Mohs' micrographic surgery via secondary intention    PLAN:  Site cleaned with peroxide  Dressed with petrolatum and Telfa and tape  Continue current care  Reviewed anticipated course  Followup 6 weeks; call prn sooner

## 2019-09-30 ENCOUNTER — IMMUNIZATION (OUTPATIENT)
Dept: FAMILY MEDICINE | Facility: CLINIC | Age: 84
End: 2019-09-30
Payer: MEDICARE

## 2019-09-30 PROCEDURE — 90662 IIV NO PRSV INCREASED AG IM: CPT | Mod: PBBFAC,PO

## 2019-10-03 ENCOUNTER — TELEPHONE (OUTPATIENT)
Dept: DERMATOLOGY | Facility: CLINIC | Age: 84
End: 2019-10-03

## 2019-10-30 ENCOUNTER — TELEPHONE (OUTPATIENT)
Dept: DERMATOLOGY | Facility: CLINIC | Age: 84
End: 2019-10-30

## 2019-11-19 ENCOUNTER — TELEPHONE (OUTPATIENT)
Dept: DERMATOLOGY | Facility: CLINIC | Age: 84
End: 2019-11-19

## 2020-03-12 PROBLEM — R53.1 WEAKNESS: Status: RESOLVED | Noted: 2019-07-09 | Resolved: 2020-03-12

## 2020-03-12 PROBLEM — Z74.09 IMPAIRED FUNCTIONAL MOBILITY, BALANCE, GAIT, AND ENDURANCE: Status: RESOLVED | Noted: 2019-07-09 | Resolved: 2020-03-12

## 2020-07-15 DIAGNOSIS — Z71.89 COMPLEX CARE COORDINATION: ICD-10-CM

## 2020-08-05 ENCOUNTER — OFFICE VISIT (OUTPATIENT)
Dept: FAMILY MEDICINE | Facility: CLINIC | Age: 85
End: 2020-08-05
Payer: MEDICARE

## 2020-08-05 VITALS
HEIGHT: 74 IN | OXYGEN SATURATION: 98 % | SYSTOLIC BLOOD PRESSURE: 124 MMHG | HEART RATE: 78 BPM | BODY MASS INDEX: 22.21 KG/M2 | WEIGHT: 173.06 LBS | TEMPERATURE: 98 F | DIASTOLIC BLOOD PRESSURE: 84 MMHG

## 2020-08-05 DIAGNOSIS — I77.1 TORTUOUS AORTA: ICD-10-CM

## 2020-08-05 DIAGNOSIS — N18.4 CKD (CHRONIC KIDNEY DISEASE) STAGE 4, GFR 15-29 ML/MIN: ICD-10-CM

## 2020-08-05 DIAGNOSIS — M79.674 TOE PAIN, RIGHT: Primary | ICD-10-CM

## 2020-08-05 PROCEDURE — 99999 PR PBB SHADOW E&M-EST. PATIENT-LVL IV: ICD-10-PCS | Mod: PBBFAC,,, | Performed by: NURSE PRACTITIONER

## 2020-08-05 PROCEDURE — 99214 OFFICE O/P EST MOD 30 MIN: CPT | Mod: S$PBB,,, | Performed by: NURSE PRACTITIONER

## 2020-08-05 PROCEDURE — 99214 OFFICE O/P EST MOD 30 MIN: CPT | Mod: PBBFAC,PO | Performed by: NURSE PRACTITIONER

## 2020-08-05 PROCEDURE — 99999 PR PBB SHADOW E&M-EST. PATIENT-LVL IV: CPT | Mod: PBBFAC,,, | Performed by: NURSE PRACTITIONER

## 2020-08-05 PROCEDURE — 99214 PR OFFICE/OUTPT VISIT, EST, LEVL IV, 30-39 MIN: ICD-10-PCS | Mod: S$PBB,,, | Performed by: NURSE PRACTITIONER

## 2020-08-05 NOTE — PROGRESS NOTES
Subjective:       Patient ID: Farnaz Jarquin is a 84 y.o. male.    Chief Complaint: Bleeding/Bruising (TOE )    Patient who is known to me presents for toe pain and discoloration. He has been feeling fatigue and states will start exercising. He noticed the toe this morning. He denies any recent injuries. It is mildly painful and he noticed bruising to the toe. He states nothing makes the pain worse or better.     Toe Pain   The incident occurred 12 to 24 hours ago. The incident occurred at home. There was no injury mechanism. The pain is present in the right foot. The quality of the pain is described as aching. The pain is mild. The pain has been intermittent since onset. Pertinent negatives include no loss of motion, loss of sensation, numbness or tingling. He reports no foreign bodies present. The symptoms are aggravated by palpation. He has tried nothing for the symptoms.     Review of Systems   Constitutional: Negative for chills, fatigue and fever.   HENT: Negative for congestion, ear pain, sinus pressure and sore throat.    Respiratory: Negative for shortness of breath and wheezing.    Cardiovascular: Negative for chest pain and leg swelling.   Gastrointestinal: Negative for abdominal distention and abdominal pain.   Genitourinary: Negative for dysuria and flank pain.   Musculoskeletal: Positive for arthralgias.   Skin: Negative for color change and pallor.   Neurological: Negative for tingling, light-headedness, numbness and headaches.       Objective:      Physical Exam  Vitals signs and nursing note reviewed.   Constitutional:       Appearance: He is well-developed.   HENT:      Head: Normocephalic and atraumatic.      Right Ear: External ear normal.      Left Ear: External ear normal.   Eyes:      Conjunctiva/sclera: Conjunctivae normal.      Pupils: Pupils are equal, round, and reactive to light.   Neck:      Musculoskeletal: Normal range of motion and neck supple.   Cardiovascular:      Rate and  Rhythm: Normal rate and regular rhythm.      Pulses:           Dorsalis pedis pulses are 1+ on the right side.        Posterior tibial pulses are 1+ on the right side.   Pulmonary:      Effort: Pulmonary effort is normal.      Breath sounds: Normal breath sounds.   Abdominal:      General: Bowel sounds are normal.      Palpations: Abdomen is soft.   Musculoskeletal: Normal range of motion.      Right foot: Normal range of motion. Bunion present.        Feet:    Feet:      Right foot:      Skin integrity: Erythema present. No skin breakdown, warmth or callus.      Toenail Condition: Right toenails are abnormally thick.   Skin:     General: Skin is warm and dry.   Neurological:      Mental Status: He is alert and oriented to person, place, and time.             Assessment:       1. Toe pain, right    2. Tortuous aorta    3. CKD (chronic kidney disease) stage 4, GFR 15-29 ml/min        Plan:       Farnaz was seen today for bleeding/bruising.    Diagnoses and all orders for this visit:    Toe pain, right  -     X-Ray Toe 2 or More Views Right; Future    Tortuous aorta    CKD (chronic kidney disease) stage 4, GFR 15-29 ml/min  -     Basic metabolic panel; Future      Will follow up on xray. Discussed worsening signs/symptoms and when to return to clinic or go to ED.   Patient expresses understanding and agrees with treatment plan.

## 2020-08-07 ENCOUNTER — HOSPITAL ENCOUNTER (OUTPATIENT)
Dept: RADIOLOGY | Facility: HOSPITAL | Age: 85
Discharge: HOME | End: 2020-08-07
Attending: NURSE PRACTITIONER
Payer: MEDICARE

## 2020-08-07 ENCOUNTER — TELEPHONE (OUTPATIENT)
Dept: NEPHROLOGY | Facility: CLINIC | Age: 85
End: 2020-08-07

## 2020-08-07 DIAGNOSIS — M79.674 TOE PAIN, RIGHT: ICD-10-CM

## 2020-08-07 DIAGNOSIS — N18.4 CKD (CHRONIC KIDNEY DISEASE) STAGE 4, GFR 15-29 ML/MIN: Primary | ICD-10-CM

## 2020-08-07 PROCEDURE — 73660 X-RAY EXAM OF TOE(S): CPT | Mod: TC,FY,PO,RT

## 2020-08-07 PROCEDURE — 73660 XR TOE 2 OR MORE VIEWS RIGHT: ICD-10-PCS | Mod: 26,RT,, | Performed by: RADIOLOGY

## 2020-08-07 PROCEDURE — 73660 X-RAY EXAM OF TOE(S): CPT | Mod: 26,RT,, | Performed by: RADIOLOGY

## 2020-08-07 NOTE — TELEPHONE ENCOUNTER
----- Message from Chhaya Waterman LPN sent at 7/13/2020 10:52 AM CDT -----      ----- Message -----  From: SYSTEM  Sent: 12/19/2019  12:16 AM CDT  To: [facility] Neph Clinical Support

## 2020-08-11 ENCOUNTER — TELEPHONE (OUTPATIENT)
Dept: FAMILY MEDICINE | Facility: CLINIC | Age: 85
End: 2020-08-11

## 2020-08-12 ENCOUNTER — TELEPHONE (OUTPATIENT)
Dept: FAMILY MEDICINE | Facility: CLINIC | Age: 85
End: 2020-08-12

## 2020-08-12 DIAGNOSIS — M79.671 RIGHT FOOT PAIN: Primary | ICD-10-CM

## 2020-08-24 ENCOUNTER — PATIENT OUTREACH (OUTPATIENT)
Dept: ADMINISTRATIVE | Facility: OTHER | Age: 85
End: 2020-08-24

## 2020-08-24 NOTE — PROGRESS NOTES
PCP patient outreach/ Office Visit 8/5/2020  Chart was reviewed for overdue Proactive [facility] Encounters (PENELOPE)  topics  Updates were unable to be requested from care everywhere  Health Maintenance was unable to be updated  LINKS immunization registry triggered

## 2020-11-19 ENCOUNTER — OFFICE VISIT (OUTPATIENT)
Dept: OPTOMETRY | Facility: CLINIC | Age: 85
End: 2020-11-19
Payer: MEDICARE

## 2020-11-19 ENCOUNTER — TELEPHONE (OUTPATIENT)
Dept: OPTOMETRY | Facility: CLINIC | Age: 85
End: 2020-11-19

## 2020-11-19 DIAGNOSIS — H18.12 BULLOUS KERATOPATHY OF LEFT EYE: Primary | ICD-10-CM

## 2020-11-19 DIAGNOSIS — S05.02XA ABRASION OF LEFT CORNEA, INITIAL ENCOUNTER: ICD-10-CM

## 2020-11-19 PROCEDURE — 99999 PR PBB SHADOW E&M-EST. PATIENT-LVL II: CPT | Mod: PBBFAC,,, | Performed by: OPTOMETRIST

## 2020-11-19 PROCEDURE — 92002 PR EYE EXAM, NEW PATIENT,INTERMED: ICD-10-PCS | Mod: S$PBB,,, | Performed by: OPTOMETRIST

## 2020-11-19 PROCEDURE — 92002 INTRM OPH EXAM NEW PATIENT: CPT | Mod: S$PBB,,, | Performed by: OPTOMETRIST

## 2020-11-19 PROCEDURE — 99999 PR PBB SHADOW E&M-EST. PATIENT-LVL II: ICD-10-PCS | Mod: PBBFAC,,, | Performed by: OPTOMETRIST

## 2020-11-19 PROCEDURE — 99212 OFFICE O/P EST SF 10 MIN: CPT | Mod: PBBFAC,PO | Performed by: OPTOMETRIST

## 2020-11-19 RX ORDER — MOXIFLOXACIN 5 MG/ML
1 SOLUTION/ DROPS OPHTHALMIC
Qty: 3 ML | Refills: 1 | Status: SHIPPED | OUTPATIENT
Start: 2020-11-19 | End: 2020-11-26

## 2020-11-19 RX ORDER — BACITRACIN ZINC AND POLYMYXIN B SULFATE 500; 10000 [USP'U]/G; [USP'U]/G
0.5 OINTMENT OPHTHALMIC NIGHTLY
Qty: 3.5 G | Refills: 1 | Status: SHIPPED | OUTPATIENT
Start: 2020-11-19 | End: 2020-11-29

## 2020-11-19 NOTE — PROGRESS NOTES
HPI     Urgent care-eye pain and swelling OS    Pt complains of eye pain/pressure, swelling, red and watering x 2 days. Pt   states hes tried cold compress without relief. Complains of blurred   vision-HM OS. No flashes or floaters. Using Visine prn.    Last edited by Eve Colbert on 11/19/2020  2:20 PM. (History)            Assessment /Plan     For exam results, see Encounter Report.    Bullous keratopathy of left eye  -     moxifloxacin (VIGAMOX) 0.5 % ophthalmic solution; Place 1 drop into the left eye every 2 (two) hours. for 7 days  Dispense: 3 mL; Refill: 1  -     bacitracin-polymyxin b (POLYSPORIN) ophthalmic ointment; Place a 1/2 inch ribbon of ointment into lower eyelid of the left eye every evening for 10 days  Dispense: 3.5 g; Refill: 1    Abrasion of left cornea, initial encounter  -     moxifloxacin (VIGAMOX) 0.5 % ophthalmic solution; Place 1 drop into the left eye every 2 (two) hours. for 7 days  Dispense: 3 mL; Refill: 1  -     bacitracin-polymyxin b (POLYSPORIN) ophthalmic ointment; Place a 1/2 inch ribbon of ointment into lower eyelid of the left eye every evening for 10 days  Dispense: 3.5 g; Refill: 1      1,2. Epi defect os, ulcer vs abrasion, difficult to determine due to cornea scarring, will start Vigamox drops q2h during the day and ointment at bedtime. Follow up with Jessi next week, pt has seen her before. History of bullous due to ACIOL. IOP WNL today, pt has not been using Combigan or Yoko for years.

## 2020-11-19 NOTE — TELEPHONE ENCOUNTER
----- Message from Jade Eldridge, Patient Care Assistant sent at 11/19/2020  9:58 AM CST -----  Regarding: same day appt  Contact: patient  Type:  Same Day Appointment Request    Caller is requesting a same day appointment.  Caller declined first available appointment listed below.      Name of Caller:  patient   When is the first available appointment?  03/02/21  Symptoms:  left eye pain  Best Call Back Number:  [phone] (home)   Additional Information: please call pt to advice. Thanks!

## 2020-11-22 ENCOUNTER — PATIENT OUTREACH (OUTPATIENT)
Dept: ADMINISTRATIVE | Facility: OTHER | Age: 85
End: 2020-11-22

## 2020-11-24 ENCOUNTER — OFFICE VISIT (OUTPATIENT)
Dept: OPHTHALMOLOGY | Facility: CLINIC | Age: 85
End: 2020-11-24
Payer: MEDICARE

## 2020-11-24 DIAGNOSIS — H18.12 BULLOUS KERATOPATHY, LEFT: ICD-10-CM

## 2020-11-24 DIAGNOSIS — H16.002 CORNEA ULCER, LEFT: Primary | ICD-10-CM

## 2020-11-24 PROCEDURE — 99999 PR PBB SHADOW E&M-EST. PATIENT-LVL II: ICD-10-PCS | Mod: PBBFAC,,, | Performed by: OPHTHALMOLOGY

## 2020-11-24 PROCEDURE — 99999 PR PBB SHADOW E&M-EST. PATIENT-LVL II: CPT | Mod: PBBFAC,,, | Performed by: OPHTHALMOLOGY

## 2020-11-24 PROCEDURE — 92004 COMPRE OPH EXAM NEW PT 1/>: CPT | Mod: S$PBB,,, | Performed by: OPHTHALMOLOGY

## 2020-11-24 PROCEDURE — 92004 PR EYE EXAM, NEW PATIENT,COMPREHESV: ICD-10-PCS | Mod: S$PBB,,, | Performed by: OPHTHALMOLOGY

## 2020-11-24 PROCEDURE — 99212 OFFICE O/P EST SF 10 MIN: CPT | Mod: PBBFAC,PO | Performed by: OPHTHALMOLOGY

## 2020-11-24 NOTE — PROGRESS NOTES
HPI     Dr Koch / prior huey pt.    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   Pt states it still feels scratchy  Vision seems to be more cloudy than normal  Pt states he can see light in outer periph when he gets up in the dark          Last edited by Mayela Bowen MD on 11/24/2020 12:09 PM. (History)            Assessment /Plan     For exam results, see Encounter Report.    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    After infection healed, can address PBK aspect --> TPK vs. BCL vs. dalila    Traumatic glc OS   - was on combigan in the past.     Glaucoma suspect, right eye  -Enlarged CDR 0.6 with possible notching inferiorly  -Negative FHx, IOP wnl, Average CCT    Monocular precautions - full time polycarb lenses to be worn at all times.    F/up 1 wk - va/IOP OU

## 2020-11-27 ENCOUNTER — TELEPHONE (OUTPATIENT)
Dept: OPHTHALMOLOGY | Facility: CLINIC | Age: 85
End: 2020-11-27

## 2020-11-27 NOTE — TELEPHONE ENCOUNTER
lvm and call back number to r/s appt time   Move to either later in am or afternoon  Tuesday 12/01 in [place] with dr byers         ----- Message from Mayela Byers MD sent at 11/27/2020  3:45 PM CST -----  Can you move his appt to later in morning or afternoon?

## 2020-12-01 ENCOUNTER — OFFICE VISIT (OUTPATIENT)
Dept: OPHTHALMOLOGY | Facility: CLINIC | Age: 85
End: 2020-12-01
Payer: MEDICARE

## 2020-12-01 DIAGNOSIS — H18.12 BULLOUS KERATOPATHY, LEFT: Primary | ICD-10-CM

## 2020-12-01 PROCEDURE — 99999 PR PBB SHADOW E&M-EST. PATIENT-LVL II: ICD-10-PCS | Mod: PBBFAC,,, | Performed by: OPHTHALMOLOGY

## 2020-12-01 PROCEDURE — 99999 PR PBB SHADOW E&M-EST. PATIENT-LVL II: CPT | Mod: PBBFAC,,, | Performed by: OPHTHALMOLOGY

## 2020-12-01 PROCEDURE — 92014 COMPRE OPH EXAM EST PT 1/>: CPT | Mod: S$PBB,,, | Performed by: OPHTHALMOLOGY

## 2020-12-01 PROCEDURE — 99212 OFFICE O/P EST SF 10 MIN: CPT | Mod: PBBFAC,PO | Performed by: OPHTHALMOLOGY

## 2020-12-01 PROCEDURE — 92014 PR EYE EXAM, EST PATIENT,COMPREHESV: ICD-10-PCS | Mod: S$PBB,,, | Performed by: OPHTHALMOLOGY

## 2020-12-01 RX ORDER — NEOMYCIN AND POLYMYXIN B SULFATES AND BACITRACIN ZINC 400; 3.5; 1 [USP'U]/G; MG/G; [USP'U]/G
1 OINTMENT OPHTHALMIC NIGHTLY
COMMUNITY

## 2020-12-01 RX ORDER — NEOMYCIN SULFATE, POLYMYXIN B SULFATE, AND DEXAMETHASONE 3.5; 10000; 1 MG/G; [USP'U]/G; MG/G
OINTMENT OPHTHALMIC NIGHTLY
Qty: 3.5 G | Refills: 3 | Status: SHIPPED | OUTPATIENT
Start: 2020-12-01 | End: 2020-12-31

## 2020-12-01 NOTE — PROGRESS NOTES
HPI     Dr Koch / prior huey pt.    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  Pt states OS feels slightly better but still   scratchy.  Pt denies eye pain OS.       Last edited by Mayela Bowen MD on 12/1/2020 10:26 AM. (History)            Assessment /Plan     For exam results, see Encounter Report.    Bullous keratopathy, left    Other orders  -     neomycin-polymyxin-dexamethasone (DEXACINE) 3.5 mg/g-10,000 unit/g-0.1 % Oint; Place into the left eye every evening.  Dispense: 3.5 g; Refill: 3      Infectious keratitis OS resolved on vigamox    Pt still bothered by FBS --> wants to continue with susan qhs, if NI can discuss BCL vs. TPK    maxitrol susan prn OS    F/up rosy GARCIA

## 2021-02-28 ENCOUNTER — EXTERNAL CHRONIC CARE MANAGEMENT (OUTPATIENT)
Dept: PRIMARY CARE CLINIC | Facility: CLINIC | Age: 86
End: 2021-02-28
Payer: MEDICARE

## 2021-02-28 PROCEDURE — 99490 PR CHRONIC CARE MGMT, 1ST 20 MIN: ICD-10-PCS | Mod: S$GLB,,, | Performed by: INTERNAL MEDICINE

## 2021-02-28 PROCEDURE — 99490 CHRNC CARE MGMT STAFF 1ST 20: CPT | Mod: S$GLB,,, | Performed by: INTERNAL MEDICINE

## 2021-02-28 PROCEDURE — 99490 CHRNC CARE MGMT STAFF 1ST 20: CPT | Mod: PBBFAC,PO | Performed by: INTERNAL MEDICINE

## 2021-03-31 ENCOUNTER — EXTERNAL CHRONIC CARE MANAGEMENT (OUTPATIENT)
Dept: PRIMARY CARE CLINIC | Facility: CLINIC | Age: 86
End: 2021-03-31
Payer: MEDICARE

## 2021-03-31 PROCEDURE — 99490 CHRNC CARE MGMT STAFF 1ST 20: CPT | Mod: S$GLB,,, | Performed by: INTERNAL MEDICINE

## 2021-03-31 PROCEDURE — 99490 PR CHRONIC CARE MGMT, 1ST 20 MIN: ICD-10-PCS | Mod: S$GLB,,, | Performed by: INTERNAL MEDICINE

## 2021-03-31 PROCEDURE — 99490 CHRNC CARE MGMT STAFF 1ST 20: CPT | Mod: PBBFAC,PO | Performed by: INTERNAL MEDICINE

## 2021-04-30 ENCOUNTER — EXTERNAL CHRONIC CARE MANAGEMENT (OUTPATIENT)
Dept: PRIMARY CARE CLINIC | Facility: CLINIC | Age: 86
End: 2021-04-30
Payer: MEDICARE

## 2021-04-30 PROCEDURE — 99490 PR CHRONIC CARE MGMT, 1ST 20 MIN: ICD-10-PCS | Mod: S$GLB,,, | Performed by: INTERNAL MEDICINE

## 2021-04-30 PROCEDURE — 99490 CHRNC CARE MGMT STAFF 1ST 20: CPT | Mod: S$GLB,,, | Performed by: INTERNAL MEDICINE

## 2021-05-31 ENCOUNTER — EXTERNAL CHRONIC CARE MANAGEMENT (OUTPATIENT)
Dept: PRIMARY CARE CLINIC | Facility: CLINIC | Age: 86
End: 2021-05-31
Payer: MEDICARE

## 2021-05-31 PROCEDURE — 99490 CHRNC CARE MGMT STAFF 1ST 20: CPT | Mod: S$GLB,,, | Performed by: INTERNAL MEDICINE

## 2021-05-31 PROCEDURE — 99490 PR CHRONIC CARE MGMT, 1ST 20 MIN: ICD-10-PCS | Mod: S$GLB,,, | Performed by: INTERNAL MEDICINE

## 2021-06-30 ENCOUNTER — EXTERNAL CHRONIC CARE MANAGEMENT (OUTPATIENT)
Dept: PRIMARY CARE CLINIC | Facility: CLINIC | Age: 86
End: 2021-06-30
Payer: MEDICARE

## 2021-06-30 PROCEDURE — 99490 CHRNC CARE MGMT STAFF 1ST 20: CPT | Mod: S$GLB,,, | Performed by: INTERNAL MEDICINE

## 2021-06-30 PROCEDURE — 99490 PR CHRONIC CARE MGMT, 1ST 20 MIN: ICD-10-PCS | Mod: S$GLB,,, | Performed by: INTERNAL MEDICINE

## 2021-07-31 ENCOUNTER — EXTERNAL CHRONIC CARE MANAGEMENT (OUTPATIENT)
Dept: PRIMARY CARE CLINIC | Facility: CLINIC | Age: 86
End: 2021-07-31
Payer: MEDICARE

## 2021-07-31 PROCEDURE — 99490 CHRNC CARE MGMT STAFF 1ST 20: CPT | Mod: S$GLB,,, | Performed by: INTERNAL MEDICINE

## 2021-07-31 PROCEDURE — 99490 PR CHRONIC CARE MGMT, 1ST 20 MIN: ICD-10-PCS | Mod: S$GLB,,, | Performed by: INTERNAL MEDICINE

## 2021-08-31 ENCOUNTER — EXTERNAL CHRONIC CARE MANAGEMENT (OUTPATIENT)
Dept: PRIMARY CARE CLINIC | Facility: CLINIC | Age: 86
End: 2021-08-31
Payer: MEDICARE

## 2021-08-31 PROCEDURE — 99490 CHRNC CARE MGMT STAFF 1ST 20: CPT | Mod: S$GLB,,, | Performed by: INTERNAL MEDICINE

## 2021-08-31 PROCEDURE — 99490 PR CHRONIC CARE MGMT, 1ST 20 MIN: ICD-10-PCS | Mod: S$GLB,,, | Performed by: INTERNAL MEDICINE

## 2021-09-30 ENCOUNTER — EXTERNAL CHRONIC CARE MANAGEMENT (OUTPATIENT)
Dept: PRIMARY CARE CLINIC | Facility: CLINIC | Age: 86
End: 2021-09-30
Payer: MEDICARE

## 2021-09-30 PROCEDURE — 99490 PR CHRONIC CARE MGMT, 1ST 20 MIN: ICD-10-PCS | Mod: S$GLB,,, | Performed by: INTERNAL MEDICINE

## 2021-09-30 PROCEDURE — 99490 CHRNC CARE MGMT STAFF 1ST 20: CPT | Mod: S$GLB,,, | Performed by: INTERNAL MEDICINE

## 2021-10-31 ENCOUNTER — EXTERNAL CHRONIC CARE MANAGEMENT (OUTPATIENT)
Dept: PRIMARY CARE CLINIC | Facility: CLINIC | Age: 86
End: 2021-10-31
Payer: MEDICARE

## 2021-10-31 PROCEDURE — 99489 CPLX CHRNC CARE EA ADDL 30: CPT | Mod: S$GLB,,, | Performed by: INTERNAL MEDICINE

## 2021-10-31 PROCEDURE — 99487 PR COMPLX CHRON CARE MGMT, 1ST HR, PER MONTH: ICD-10-PCS | Mod: S$GLB,,, | Performed by: INTERNAL MEDICINE

## 2021-10-31 PROCEDURE — 99489 PR COMPLX CHRON CARE MGMT, EA ADDTL 30 MIN, PER MONTH: ICD-10-PCS | Mod: S$GLB,,, | Performed by: INTERNAL MEDICINE

## 2021-10-31 PROCEDURE — 99487 CPLX CHRNC CARE 1ST 60 MIN: CPT | Mod: S$GLB,,, | Performed by: INTERNAL MEDICINE

## 2021-11-30 ENCOUNTER — EXTERNAL CHRONIC CARE MANAGEMENT (OUTPATIENT)
Dept: PRIMARY CARE CLINIC | Facility: CLINIC | Age: 86
End: 2021-11-30
Payer: MEDICARE

## 2021-11-30 PROCEDURE — 99490 CHRNC CARE MGMT STAFF 1ST 20: CPT | Mod: S$GLB,,, | Performed by: INTERNAL MEDICINE

## 2021-11-30 PROCEDURE — 99490 PR CHRONIC CARE MGMT, 1ST 20 MIN: ICD-10-PCS | Mod: S$GLB,,, | Performed by: INTERNAL MEDICINE

## 2021-12-03 ENCOUNTER — OFFICE VISIT (OUTPATIENT)
Dept: FAMILY MEDICINE | Facility: CLINIC | Age: 86
End: 2021-12-03
Payer: MEDICARE

## 2021-12-03 ENCOUNTER — LAB VISIT (OUTPATIENT)
Dept: LAB | Facility: HOSPITAL | Age: 86
End: 2021-12-03
Attending: INTERNAL MEDICINE
Payer: MEDICARE

## 2021-12-03 VITALS
DIASTOLIC BLOOD PRESSURE: 72 MMHG | BODY MASS INDEX: 20.88 KG/M2 | SYSTOLIC BLOOD PRESSURE: 108 MMHG | WEIGHT: 162.56 LBS | OXYGEN SATURATION: 97 % | HEART RATE: 71 BPM

## 2021-12-03 DIAGNOSIS — N18.4 CKD (CHRONIC KIDNEY DISEASE) STAGE 4, GFR 15-29 ML/MIN: ICD-10-CM

## 2021-12-03 DIAGNOSIS — K59.00 CONSTIPATION, UNSPECIFIED CONSTIPATION TYPE: ICD-10-CM

## 2021-12-03 DIAGNOSIS — R30.0 DYSURIA: Primary | ICD-10-CM

## 2021-12-03 DIAGNOSIS — R32 URINARY INCONTINENCE, UNSPECIFIED TYPE: ICD-10-CM

## 2021-12-03 LAB
ALBUMIN SERPL BCP-MCNC: 3.8 G/DL (ref 3.5–5.2)
ALP SERPL-CCNC: 74 U/L (ref 55–135)
ALT SERPL W/O P-5'-P-CCNC: 18 U/L (ref 10–44)
ANION GAP SERPL CALC-SCNC: 9 MMOL/L (ref 8–16)
AST SERPL-CCNC: 26 U/L (ref 10–40)
BILIRUB SERPL-MCNC: 0.6 MG/DL (ref 0.1–1)
BUN SERPL-MCNC: 30 MG/DL (ref 8–23)
CALCIUM SERPL-MCNC: 9.7 MG/DL (ref 8.7–10.5)
CHLORIDE SERPL-SCNC: 105 MMOL/L (ref 95–110)
CO2 SERPL-SCNC: 24 MMOL/L (ref 23–29)
CREAT SERPL-MCNC: 2.1 MG/DL (ref 0.5–1.4)
ERYTHROCYTE [DISTWIDTH] IN BLOOD BY AUTOMATED COUNT: 13.8 % (ref 11.5–14.5)
EST. GFR  (AFRICAN AMERICAN): 32.4 ML/MIN/1.73 M^2
EST. GFR  (NON AFRICAN AMERICAN): 28.1 ML/MIN/1.73 M^2
GLUCOSE SERPL-MCNC: 100 MG/DL (ref 70–110)
HCT VFR BLD AUTO: 46.2 % (ref 40–54)
HGB BLD-MCNC: 14.8 G/DL (ref 14–18)
MCH RBC QN AUTO: 31 PG (ref 27–31)
MCHC RBC AUTO-ENTMCNC: 32 G/DL (ref 32–36)
MCV RBC AUTO: 97 FL (ref 82–98)
PLATELET # BLD AUTO: 279 K/UL (ref 150–450)
PMV BLD AUTO: 10 FL (ref 9.2–12.9)
POTASSIUM SERPL-SCNC: 5 MMOL/L (ref 3.5–5.1)
PROT SERPL-MCNC: 7.3 G/DL (ref 6–8.4)
RBC # BLD AUTO: 4.78 M/UL (ref 4.6–6.2)
SODIUM SERPL-SCNC: 138 MMOL/L (ref 136–145)
WBC # BLD AUTO: 5.77 K/UL (ref 3.9–12.7)

## 2021-12-03 PROCEDURE — 99999 PR PBB SHADOW E&M-EST. PATIENT-LVL III: CPT | Mod: PBBFAC,HCNC,, | Performed by: INTERNAL MEDICINE

## 2021-12-03 PROCEDURE — 99214 OFFICE O/P EST MOD 30 MIN: CPT | Mod: HCNC,S$GLB,, | Performed by: INTERNAL MEDICINE

## 2021-12-03 PROCEDURE — 85027 COMPLETE CBC AUTOMATED: CPT | Mod: HCNC | Performed by: INTERNAL MEDICINE

## 2021-12-03 PROCEDURE — 80053 COMPREHEN METABOLIC PANEL: CPT | Mod: HCNC | Performed by: INTERNAL MEDICINE

## 2021-12-03 PROCEDURE — 99999 PR PBB SHADOW E&M-EST. PATIENT-LVL III: ICD-10-PCS | Mod: PBBFAC,HCNC,, | Performed by: INTERNAL MEDICINE

## 2021-12-03 PROCEDURE — 99499 UNLISTED E&M SERVICE: CPT | Mod: HCNC,S$GLB,, | Performed by: INTERNAL MEDICINE

## 2021-12-03 PROCEDURE — 99499 RISK ADDL DX/OHS AUDIT: ICD-10-PCS | Mod: HCNC,S$GLB,, | Performed by: INTERNAL MEDICINE

## 2021-12-03 PROCEDURE — 36415 COLL VENOUS BLD VENIPUNCTURE: CPT | Mod: HCNC,PO | Performed by: INTERNAL MEDICINE

## 2021-12-03 PROCEDURE — 87086 URINE CULTURE/COLONY COUNT: CPT | Mod: HCNC | Performed by: INTERNAL MEDICINE

## 2021-12-03 PROCEDURE — 99214 PR OFFICE/OUTPT VISIT, EST, LEVL IV, 30-39 MIN: ICD-10-PCS | Mod: HCNC,S$GLB,, | Performed by: INTERNAL MEDICINE

## 2021-12-05 LAB — BACTERIA UR CULT: NO GROWTH

## 2021-12-07 ENCOUNTER — CLINICAL SUPPORT (OUTPATIENT)
Dept: URGENT CARE | Facility: CLINIC | Age: 86
End: 2021-12-07
Payer: MEDICARE

## 2021-12-07 ENCOUNTER — TELEPHONE (OUTPATIENT)
Dept: FAMILY MEDICINE | Facility: CLINIC | Age: 86
End: 2021-12-07
Payer: MEDICARE

## 2021-12-07 DIAGNOSIS — Z11.52 ENCOUNTER FOR SCREENING FOR COVID-19: Primary | ICD-10-CM

## 2021-12-07 LAB
CTP QC/QA: YES
SARS-COV-2 RDRP RESP QL NAA+PROBE: NEGATIVE

## 2021-12-07 PROCEDURE — U0002 COVID-19 LAB TEST NON-CDC: HCPCS | Mod: QW,S$GLB,, | Performed by: EMERGENCY MEDICINE

## 2021-12-07 PROCEDURE — 99211 PR OFFICE/OUTPT VISIT, EST, LEVL I: ICD-10-PCS | Mod: S$GLB,,, | Performed by: EMERGENCY MEDICINE

## 2021-12-07 PROCEDURE — U0002: ICD-10-PCS | Mod: QW,S$GLB,, | Performed by: EMERGENCY MEDICINE

## 2021-12-07 PROCEDURE — 99211 OFF/OP EST MAY X REQ PHY/QHP: CPT | Mod: S$GLB,,, | Performed by: EMERGENCY MEDICINE

## 2021-12-31 ENCOUNTER — EXTERNAL CHRONIC CARE MANAGEMENT (OUTPATIENT)
Dept: PRIMARY CARE CLINIC | Facility: CLINIC | Age: 86
End: 2021-12-31
Payer: MEDICARE

## 2021-12-31 PROCEDURE — 99490 CHRNC CARE MGMT STAFF 1ST 20: CPT | Mod: S$GLB,,, | Performed by: INTERNAL MEDICINE

## 2021-12-31 PROCEDURE — 99490 PR CHRONIC CARE MGMT, 1ST 20 MIN: ICD-10-PCS | Mod: S$GLB,,, | Performed by: INTERNAL MEDICINE

## 2022-01-27 ENCOUNTER — TELEPHONE (OUTPATIENT)
Dept: UROLOGY | Facility: CLINIC | Age: 87
End: 2022-01-27
Payer: MEDICARE

## 2022-01-27 DIAGNOSIS — R35.0 INCREASED URINARY FREQUENCY: Primary | ICD-10-CM

## 2022-01-27 NOTE — TELEPHONE ENCOUNTER
----- Message from Hananevenu Santos sent at 1/27/2022  2:16 PM CST -----  Regarding: refill  Type: Needs Medical Advice    Who Called:      Best Call Back Number: [phone]    Additional Information: Requesting a call back from Nurse, regarding pt has a UTI and wants a Rx called in ,please advise and call back ,pt wants to know if it can be mailed     [facility]   Phone:  [phone]  Fax:  [phone]

## 2022-01-27 NOTE — TELEPHONE ENCOUNTER
Advised would need to have urine tested before we could send antibiotic. Patient will come in to leave sample at the lab

## 2022-01-28 ENCOUNTER — LAB VISIT (OUTPATIENT)
Dept: LAB | Facility: HOSPITAL | Age: 87
End: 2022-01-28
Attending: INTERNAL MEDICINE
Payer: MEDICARE

## 2022-01-28 ENCOUNTER — TELEPHONE (OUTPATIENT)
Dept: UROLOGY | Facility: CLINIC | Age: 87
End: 2022-01-28
Payer: MEDICARE

## 2022-01-28 DIAGNOSIS — R35.0 INCREASED URINARY FREQUENCY: ICD-10-CM

## 2022-01-28 LAB
BACTERIA #/AREA URNS HPF: ABNORMAL /HPF
BILIRUB UR QL STRIP: NEGATIVE
CLARITY UR: ABNORMAL
COLOR UR: YELLOW
GLUCOSE UR QL STRIP: NEGATIVE
HGB UR QL STRIP: ABNORMAL
KETONES UR QL STRIP: NEGATIVE
LEUKOCYTE ESTERASE UR QL STRIP: ABNORMAL
MICROSCOPIC COMMENT: ABNORMAL
NITRITE UR QL STRIP: NEGATIVE
PH UR STRIP: 6 [PH] (ref 5–8)
PROT UR QL STRIP: ABNORMAL
RBC #/AREA URNS HPF: 5 /HPF (ref 0–4)
SP GR UR STRIP: 1.02 (ref 1–1.03)
URN SPEC COLLECT METH UR: ABNORMAL
WBC #/AREA URNS HPF: >100 /HPF (ref 0–5)

## 2022-01-28 PROCEDURE — 87086 URINE CULTURE/COLONY COUNT: CPT | Mod: HCNC | Performed by: UROLOGY

## 2022-01-28 PROCEDURE — 81000 URINALYSIS NONAUTO W/SCOPE: CPT | Mod: HCNC,PO | Performed by: UROLOGY

## 2022-01-28 RX ORDER — CEPHALEXIN 500 MG/1
500 CAPSULE ORAL EVERY 8 HOURS
Qty: 30 CAPSULE | Refills: 0 | Status: SHIPPED | OUTPATIENT
Start: 2022-01-28 | End: 2022-02-07

## 2022-01-28 NOTE — TELEPHONE ENCOUNTER
Spoke with patient wife, advised her that  urien culture positive for infection, antibiotics have been sent to patient pharmacy. Patient wife expressed understanding.

## 2022-01-28 NOTE — TELEPHONE ENCOUNTER
----- Message from RHEA Ramos MD sent at 1/28/2022  3:59 PM CST -----  UA is positive.  I will send a course of Keflex to his pharmacy.

## 2022-01-29 LAB — BACTERIA UR CULT: NO GROWTH

## 2022-01-31 ENCOUNTER — EXTERNAL CHRONIC CARE MANAGEMENT (OUTPATIENT)
Dept: PRIMARY CARE CLINIC | Facility: CLINIC | Age: 87
End: 2022-01-31
Payer: MEDICARE

## 2022-01-31 ENCOUNTER — TELEPHONE (OUTPATIENT)
Dept: UROLOGY | Facility: CLINIC | Age: 87
End: 2022-01-31
Payer: MEDICARE

## 2022-01-31 PROCEDURE — 99490 CHRNC CARE MGMT STAFF 1ST 20: CPT | Mod: S$GLB,,, | Performed by: INTERNAL MEDICINE

## 2022-01-31 PROCEDURE — 99490 PR CHRONIC CARE MGMT, 1ST 20 MIN: ICD-10-PCS | Mod: S$GLB,,, | Performed by: INTERNAL MEDICINE

## 2022-02-05 ENCOUNTER — NURSE TRIAGE (OUTPATIENT)
Dept: ADMINISTRATIVE | Facility: CLINIC | Age: 87
End: 2022-02-05
Payer: MEDICARE

## 2022-02-05 NOTE — TELEPHONE ENCOUNTER
Note on board: pt calling about medication interaction clarification. Is on antibiotic for infection and has a cold now. No answer x 2 attempts.    Reason for Disposition   No answer.  First attempt to contact caller.  Follow-up call scheduled within 15 minutes.   Message left on unidentified voice mail.  Phone number verified.    Protocols used: NO CONTACT OR DUPLICATE CONTACT CALL-A-AH

## 2022-02-05 NOTE — LETTER
[facility]  [address]  [facility] [address]  Phone: [phone]  Fax: [phone]   Farnaz Jarquin  1935    Pt called nurse re medication interaction clarification. I called two times and left vm- no answer. Please call pt with follow up.   Thank you  Amari

## 2022-02-07 ENCOUNTER — TELEPHONE (OUTPATIENT)
Dept: UROLOGY | Facility: CLINIC | Age: 87
End: 2022-02-07
Payer: MEDICARE

## 2022-02-07 NOTE — TELEPHONE ENCOUNTER
Patient reports he is continuing to take antibiotics for his prostate , also had to take medicine for a cold he developed. Patient just wanted to let the office know. Patient to finish antibiotics as prescribed, f/u with pcp regarding his cold. Patient expressed understanding.

## 2022-02-28 ENCOUNTER — EXTERNAL CHRONIC CARE MANAGEMENT (OUTPATIENT)
Dept: PRIMARY CARE CLINIC | Facility: CLINIC | Age: 87
End: 2022-02-28
Payer: MEDICARE

## 2022-02-28 PROCEDURE — 99490 CHRNC CARE MGMT STAFF 1ST 20: CPT | Mod: S$GLB,,, | Performed by: INTERNAL MEDICINE

## 2022-02-28 PROCEDURE — 99490 PR CHRONIC CARE MGMT, 1ST 20 MIN: ICD-10-PCS | Mod: S$GLB,,, | Performed by: INTERNAL MEDICINE

## 2023-08-16 ENCOUNTER — PES CALL (OUTPATIENT)
Dept: ADMINISTRATIVE | Facility: CLINIC | Age: 88
End: 2023-08-16
Payer: MEDICARE

## 2023-09-08 ENCOUNTER — PATIENT OUTREACH (OUTPATIENT)
Dept: ADMINISTRATIVE | Facility: HOSPITAL | Age: 88
End: 2023-09-08
Payer: MEDICARE

## 2023-09-08 NOTE — PROGRESS NOTES

## 2023-09-18 ENCOUNTER — PATIENT OUTREACH (OUTPATIENT)
Dept: ADMINISTRATIVE | Facility: HOSPITAL | Age: 88
End: 2023-09-18
Payer: MEDICARE

## 2023-09-18 ENCOUNTER — TELEPHONE (OUTPATIENT)
Dept: FAMILY MEDICINE | Facility: CLINIC | Age: 88
End: 2023-09-18
Payer: MEDICARE

## 2023-09-18 NOTE — PROGRESS NOTES

## 2023-09-18 NOTE — TELEPHONE ENCOUNTER
Patient is needing to be seen by the end of the year per Tony.     You are listed as his PCP  with the insurance. Debbie.    She would like to know if you can see him before the end of the year and if you will remain his PCP.     Lov with you was 3/26/2019    Please advise thank you

## 2023-09-20 ENCOUNTER — OFFICE VISIT (OUTPATIENT)
Dept: FAMILY MEDICINE | Facility: CLINIC | Age: 88
End: 2023-09-20
Payer: MEDICARE

## 2023-09-20 ENCOUNTER — LAB VISIT (OUTPATIENT)
Dept: LAB | Facility: HOSPITAL | Age: 88
End: 2023-09-20
Attending: INTERNAL MEDICINE
Payer: MEDICARE

## 2023-09-20 VITALS
HEART RATE: 66 BPM | WEIGHT: 166 LBS | HEIGHT: 74 IN | SYSTOLIC BLOOD PRESSURE: 130 MMHG | BODY MASS INDEX: 21.3 KG/M2 | OXYGEN SATURATION: 96 % | DIASTOLIC BLOOD PRESSURE: 82 MMHG | TEMPERATURE: 98 F

## 2023-09-20 DIAGNOSIS — Z00.00 ROUTINE PHYSICAL EXAMINATION: Primary | ICD-10-CM

## 2023-09-20 DIAGNOSIS — Z13.6 SCREENING FOR CARDIOVASCULAR CONDITION: ICD-10-CM

## 2023-09-20 DIAGNOSIS — H53.9 VISION CHANGES: ICD-10-CM

## 2023-09-20 DIAGNOSIS — N18.4 CKD (CHRONIC KIDNEY DISEASE) STAGE 4, GFR 15-29 ML/MIN: ICD-10-CM

## 2023-09-20 DIAGNOSIS — L98.9 SKIN LESIONS: ICD-10-CM

## 2023-09-20 DIAGNOSIS — I77.1 TORTUOUS AORTA: ICD-10-CM

## 2023-09-20 LAB
ALBUMIN SERPL BCP-MCNC: 3.6 G/DL (ref 3.5–5.2)
ALP SERPL-CCNC: 61 U/L (ref 55–135)
ALT SERPL W/O P-5'-P-CCNC: 14 U/L (ref 10–44)
ANION GAP SERPL CALC-SCNC: 7 MMOL/L (ref 8–16)
AST SERPL-CCNC: 22 U/L (ref 10–40)
BASOPHILS # BLD AUTO: 0.04 K/UL (ref 0–0.2)
BASOPHILS NFR BLD: 0.9 % (ref 0–1.9)
BILIRUB SERPL-MCNC: 0.8 MG/DL (ref 0.1–1)
BUN SERPL-MCNC: 23 MG/DL (ref 8–23)
CALCIUM SERPL-MCNC: 9 MG/DL (ref 8.7–10.5)
CHLORIDE SERPL-SCNC: 110 MMOL/L (ref 95–110)
CHOLEST SERPL-MCNC: 193 MG/DL (ref 120–199)
CHOLEST/HDLC SERPL: 3.9 {RATIO} (ref 2–5)
CO2 SERPL-SCNC: 24 MMOL/L (ref 23–29)
CREAT SERPL-MCNC: 2 MG/DL (ref 0.5–1.4)
DIFFERENTIAL METHOD: ABNORMAL
EOSINOPHIL # BLD AUTO: 0.1 K/UL (ref 0–0.5)
EOSINOPHIL NFR BLD: 1.1 % (ref 0–8)
ERYTHROCYTE [DISTWIDTH] IN BLOOD BY AUTOMATED COUNT: 13.6 % (ref 11.5–14.5)
EST. GFR  (NO RACE VARIABLE): 31.9 ML/MIN/1.73 M^2
GLUCOSE SERPL-MCNC: 98 MG/DL (ref 70–110)
HCT VFR BLD AUTO: 42.9 % (ref 40–54)
HDLC SERPL-MCNC: 50 MG/DL (ref 40–75)
HDLC SERPL: 25.9 % (ref 20–50)
HGB BLD-MCNC: 14.2 G/DL (ref 14–18)
IMM GRANULOCYTES # BLD AUTO: 0.01 K/UL (ref 0–0.04)
IMM GRANULOCYTES NFR BLD AUTO: 0.2 % (ref 0–0.5)
LDLC SERPL CALC-MCNC: 125.6 MG/DL (ref 63–159)
LYMPHOCYTES # BLD AUTO: 1.1 K/UL (ref 1–4.8)
LYMPHOCYTES NFR BLD: 23.3 % (ref 18–48)
MCH RBC QN AUTO: 31.3 PG (ref 27–31)
MCHC RBC AUTO-ENTMCNC: 33.1 G/DL (ref 32–36)
MCV RBC AUTO: 95 FL (ref 82–98)
MONOCYTES # BLD AUTO: 0.7 K/UL (ref 0.3–1)
MONOCYTES NFR BLD: 14 % (ref 4–15)
NEUTROPHILS # BLD AUTO: 2.8 K/UL (ref 1.8–7.7)
NEUTROPHILS NFR BLD: 60.5 % (ref 38–73)
NONHDLC SERPL-MCNC: 143 MG/DL
NRBC BLD-RTO: 0 /100 WBC
PLATELET # BLD AUTO: 200 K/UL (ref 150–450)
PMV BLD AUTO: 10.3 FL (ref 9.2–12.9)
POTASSIUM SERPL-SCNC: 4.8 MMOL/L (ref 3.5–5.1)
PROT SERPL-MCNC: 6.9 G/DL (ref 6–8.4)
RBC # BLD AUTO: 4.53 M/UL (ref 4.6–6.2)
SODIUM SERPL-SCNC: 141 MMOL/L (ref 136–145)
TRIGL SERPL-MCNC: 87 MG/DL (ref 30–150)
WBC # BLD AUTO: 4.63 K/UL (ref 3.9–12.7)

## 2023-09-20 PROCEDURE — 3288F PR FALLS RISK ASSESSMENT DOCUMENTED: ICD-10-PCS | Mod: HCNC,CPTII,S$GLB, | Performed by: INTERNAL MEDICINE

## 2023-09-20 PROCEDURE — 36415 COLL VENOUS BLD VENIPUNCTURE: CPT | Mod: HCNC,PO | Performed by: INTERNAL MEDICINE

## 2023-09-20 PROCEDURE — 1101F PT FALLS ASSESS-DOCD LE1/YR: CPT | Mod: HCNC,CPTII,S$GLB, | Performed by: INTERNAL MEDICINE

## 2023-09-20 PROCEDURE — 99397 PR PREVENTIVE VISIT,EST,65 & OVER: ICD-10-PCS | Mod: HCNC,S$GLB,, | Performed by: INTERNAL MEDICINE

## 2023-09-20 PROCEDURE — 99999 PR PBB SHADOW E&M-EST. PATIENT-LVL IV: ICD-10-PCS | Mod: PBBFAC,HCNC,, | Performed by: INTERNAL MEDICINE

## 2023-09-20 PROCEDURE — 80053 COMPREHEN METABOLIC PANEL: CPT | Mod: HCNC | Performed by: INTERNAL MEDICINE

## 2023-09-20 PROCEDURE — 1160F RVW MEDS BY RX/DR IN RCRD: CPT | Mod: HCNC,CPTII,S$GLB, | Performed by: INTERNAL MEDICINE

## 2023-09-20 PROCEDURE — 1159F MED LIST DOCD IN RCRD: CPT | Mod: HCNC,CPTII,S$GLB, | Performed by: INTERNAL MEDICINE

## 2023-09-20 PROCEDURE — 80061 LIPID PANEL: CPT | Mod: HCNC | Performed by: INTERNAL MEDICINE

## 2023-09-20 PROCEDURE — 85025 COMPLETE CBC W/AUTO DIFF WBC: CPT | Mod: HCNC | Performed by: INTERNAL MEDICINE

## 2023-09-20 PROCEDURE — 1101F PR PT FALLS ASSESS DOC 0-1 FALLS W/OUT INJ PAST YR: ICD-10-PCS | Mod: HCNC,CPTII,S$GLB, | Performed by: INTERNAL MEDICINE

## 2023-09-20 PROCEDURE — 99999 PR PBB SHADOW E&M-EST. PATIENT-LVL IV: CPT | Mod: PBBFAC,HCNC,, | Performed by: INTERNAL MEDICINE

## 2023-09-20 PROCEDURE — 1126F AMNT PAIN NOTED NONE PRSNT: CPT | Mod: HCNC,CPTII,S$GLB, | Performed by: INTERNAL MEDICINE

## 2023-09-20 PROCEDURE — 1159F PR MEDICATION LIST DOCUMENTED IN MEDICAL RECORD: ICD-10-PCS | Mod: HCNC,CPTII,S$GLB, | Performed by: INTERNAL MEDICINE

## 2023-09-20 PROCEDURE — 3288F FALL RISK ASSESSMENT DOCD: CPT | Mod: HCNC,CPTII,S$GLB, | Performed by: INTERNAL MEDICINE

## 2023-09-20 PROCEDURE — 1160F PR REVIEW ALL MEDS BY PRESCRIBER/CLIN PHARMACIST DOCUMENTED: ICD-10-PCS | Mod: HCNC,CPTII,S$GLB, | Performed by: INTERNAL MEDICINE

## 2023-09-20 PROCEDURE — 99397 PER PM REEVAL EST PAT 65+ YR: CPT | Mod: HCNC,S$GLB,, | Performed by: INTERNAL MEDICINE

## 2023-09-20 PROCEDURE — 1126F PR PAIN SEVERITY QUANTIFIED, NO PAIN PRESENT: ICD-10-PCS | Mod: HCNC,CPTII,S$GLB, | Performed by: INTERNAL MEDICINE

## 2023-09-20 NOTE — PROGRESS NOTES
Subjective:       Patient ID: Farnaz Jarquin is a 87 y.o. male.  Chief Complaint: Annual Exam     HPI    Here for routine health maintenance.      Complains of itchy moles  Complains of change in vision.  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 Routine physical examination    2. Tortuous aorta    3. CKD (chronic kidney disease) stage 4, GFR 15-29 ml/min    4. Vision changes    5. Skin lesions    6. Screening for cardiovascular condition        Plan:       Routine physical examination    Tortuous aorta    CKD (chronic kidney disease) stage 4, GFR 15-29 ml/min  -     CBC Auto Differential; Future; Expected date: 09/20/2023  -     Comprehensive Metabolic Panel; Future; Expected date: 09/20/2023    Vision changes  -     Ambulatory referral/consult to Optometry; Future; Expected date: 09/27/2023    Skin lesions  -     Ambulatory referral/consult to Dermatology; Future; Expected date: 09/27/2023    Screening for cardiovascular condition  -     Lipid Panel; Future; Expected date: 09/20/2023            Continue current management and monitor.  Other diagnoses were reviewed and found stable and will continue to monitor.  Counseled on regular exercise, maintenance of a healthy weight, balanced diet rich in fruits/vegetables and lean protein, and avoidance of unhealthy habits like smoking and excessive alcohol intake.   Also, counseled on importance of being compliant with medication, health appointments, diet and exercise.     Follow up in about 1 year (around 9/20/2024).      Medication List with Changes/Refills   Current Medications    CARBOXYMETHYLCELLULOSE SODIUM (LUBRICANT EYE DROPS OPHT)    Apply to eye.    MULTIVITAMIN CAPSULE    Take 1 capsule by mouth once daily.    NEOMYCIN-BACITRACIN-POLYMYXIN (POLYSPORIN) OPHTHALMIC OINTMENT    Place 1 application into the left eye every evening.       BP Readings from Last 3 Encounters:   09/20/23 130/82   12/03/21  "108/72   08/05/20 124/84     No results found for: "HGBA1C"  Lab Results   Component Value Date    TSH 1.350 03/22/2011     Lab Results   Component Value Date    LDLCALC 170.4 (H) 10/12/2016    LDLCALC 129.2 (H) 03/22/2011    LDLCALC 114.6 09/17/2008     Lab Results   Component Value Date    TRIG 98 10/12/2016    TRIG 69 03/22/2011    TRIG 292 (H) 09/17/2008     Wt Readings from Last 3 Encounters:   09/20/23 75.3 kg (166 lb 0.1 oz)   12/03/21 73.8 kg (162 lb 9.4 oz)   08/05/20 78.5 kg (173 lb 1 oz)     Lab Results   Component Value Date    HGB 14.8 12/03/2021    HCT 46.2 12/03/2021    WBC 5.77 12/03/2021    ALT 18 12/03/2021    AST 26 12/03/2021     12/03/2021    K 5.0 12/03/2021    CREATININE 2.1 (H) 12/03/2021    PSA 1.5 10/12/2016           Review of Systems   Constitutional:  Negative for diaphoresis and fever.   HENT:  Negative for drooling and nosebleeds.    Eyes:  Positive for visual disturbance. Negative for discharge and redness.   Respiratory:  Negative for apnea and choking.    Cardiovascular:  Negative for chest pain and palpitations.   Gastrointestinal:  Negative for abdominal pain and nausea.   Skin:  Negative for color change.   Neurological:  Negative for seizures and syncope.   Psychiatric/Behavioral:  Negative for behavioral problems.            Objective:      Vitals:    09/20/23 0928   BP: 130/82   Pulse: 66   Temp: 97.9 °F (36.6 °C)     Physical Exam  Vitals reviewed.   Constitutional:       Appearance: Normal appearance.   Eyes:      Comments: Left eye opaque    Cardiovascular:      Rate and Rhythm: Normal rate.   Pulmonary:      Effort: Pulmonary effort is normal.      Breath sounds: Normal breath sounds.   Musculoskeletal:      Cervical back: Normal range of motion.      Comments: Normal ROM bilateral    Skin:     General: Skin is warm and dry.   Neurological:      Mental Status: He is alert.      Cranial Nerves: Cranial nerve deficit: grossly intact.   Psychiatric:      Comments: Alert " and orientated

## 2023-09-26 ENCOUNTER — TELEPHONE (OUTPATIENT)
Dept: FAMILY MEDICINE | Facility: CLINIC | Age: 88
End: 2023-09-26
Payer: MEDICARE

## 2023-09-26 NOTE — TELEPHONE ENCOUNTER
----- Message from Bin Vo MD sent at 9/22/2023  9:00 AM CDT -----  I have reviewed your results and they are acceptable.  There are no significant abnormalities that are identified.  There are a couple of lab values that are out of the lab's reference range.  These are only mildly out of range and not clinically significant.  If you have any further questions regarding these results, please contact me.  Kidney function is holding steady at 31 %.  Keep your follow up with your kidney doctor.

## 2023-12-12 ENCOUNTER — TELEPHONE (OUTPATIENT)
Dept: OPTOMETRY | Facility: CLINIC | Age: 88
End: 2023-12-12
Payer: MEDICARE

## 2025-01-13 DIAGNOSIS — Z00.00 ENCOUNTER FOR MEDICARE ANNUAL WELLNESS EXAM: ICD-10-CM

## 2025-01-28 ENCOUNTER — TELEPHONE (OUTPATIENT)
Dept: ADMINISTRATIVE | Facility: HOSPITAL | Age: 89
End: 2025-01-28
Payer: MEDICARE

## 2025-01-28 NOTE — TELEPHONE ENCOUNTER
----- Message from Nurse Chhaya sent at 1/28/2025 10:10 AM CST -----  Regarding: needs visit  Good morning,    Will you try to get this patient in for a visit to see Dr. Vo?    Thank you,  Chhaya

## 2025-08-11 ENCOUNTER — OFFICE VISIT (OUTPATIENT)
Dept: FAMILY MEDICINE | Facility: CLINIC | Age: 89
End: 2025-08-11
Payer: MEDICARE

## 2025-08-11 ENCOUNTER — LAB VISIT (OUTPATIENT)
Dept: LAB | Facility: HOSPITAL | Age: 89
End: 2025-08-11
Attending: INTERNAL MEDICINE
Payer: MEDICARE

## 2025-08-11 VITALS
TEMPERATURE: 98 F | SYSTOLIC BLOOD PRESSURE: 118 MMHG | HEIGHT: 74 IN | OXYGEN SATURATION: 98 % | WEIGHT: 146.94 LBS | BODY MASS INDEX: 18.86 KG/M2 | HEART RATE: 56 BPM | DIASTOLIC BLOOD PRESSURE: 78 MMHG

## 2025-08-11 DIAGNOSIS — N18.4 CKD (CHRONIC KIDNEY DISEASE) STAGE 4, GFR 15-29 ML/MIN: ICD-10-CM

## 2025-08-11 DIAGNOSIS — Z00.00 ROUTINE PHYSICAL EXAMINATION: Primary | ICD-10-CM

## 2025-08-11 DIAGNOSIS — R21 RASH: ICD-10-CM

## 2025-08-11 DIAGNOSIS — R41.3 MEMORY CHANGE: ICD-10-CM

## 2025-08-11 DIAGNOSIS — H18.12 BULLOUS KERATOPATHY OF LEFT EYE: ICD-10-CM

## 2025-08-11 DIAGNOSIS — H53.9 VISION CHANGES: ICD-10-CM

## 2025-08-11 LAB
ABSOLUTE EOSINOPHIL (OHS): 0.02 K/UL
ABSOLUTE MONOCYTE (OHS): 0.62 K/UL (ref 0.3–1)
ABSOLUTE NEUTROPHIL COUNT (OHS): 3.11 K/UL (ref 1.8–7.7)
ALBUMIN SERPL BCP-MCNC: 3.9 G/DL (ref 3.5–5.2)
ALP SERPL-CCNC: 56 UNIT/L (ref 40–150)
ALT SERPL W/O P-5'-P-CCNC: 13 UNIT/L (ref 0–55)
ANION GAP (OHS): 6 MMOL/L (ref 8–16)
AST SERPL-CCNC: 24 UNIT/L (ref 0–50)
BASOPHILS # BLD AUTO: 0.07 K/UL
BASOPHILS NFR BLD AUTO: 1.4 %
BILIRUB SERPL-MCNC: 0.7 MG/DL (ref 0.1–1)
BUN SERPL-MCNC: 28 MG/DL (ref 8–23)
CALCIUM SERPL-MCNC: 9.3 MG/DL (ref 8.7–10.5)
CHLORIDE SERPL-SCNC: 110 MMOL/L (ref 95–110)
CO2 SERPL-SCNC: 22 MMOL/L (ref 23–29)
CREAT SERPL-MCNC: 2 MG/DL (ref 0.5–1.4)
ERYTHROCYTE [DISTWIDTH] IN BLOOD BY AUTOMATED COUNT: 13.8 % (ref 11.5–14.5)
GFR SERPLBLD CREATININE-BSD FMLA CKD-EPI: 32 ML/MIN/1.73/M2
GLUCOSE SERPL-MCNC: 94 MG/DL (ref 70–110)
HCT VFR BLD AUTO: 41.6 % (ref 40–54)
HGB BLD-MCNC: 13.6 GM/DL (ref 14–18)
IMM GRANULOCYTES # BLD AUTO: 0.01 K/UL (ref 0–0.04)
IMM GRANULOCYTES NFR BLD AUTO: 0.2 % (ref 0–0.5)
LYMPHOCYTES # BLD AUTO: 1.18 K/UL (ref 1–4.8)
MCH RBC QN AUTO: 31.1 PG (ref 27–31)
MCHC RBC AUTO-ENTMCNC: 32.7 G/DL (ref 32–36)
MCV RBC AUTO: 95 FL (ref 82–98)
NUCLEATED RBC (/100WBC) (OHS): 0 /100 WBC
PLATELET # BLD AUTO: 214 K/UL (ref 150–450)
PMV BLD AUTO: 10.7 FL (ref 9.2–12.9)
POTASSIUM SERPL-SCNC: 4.6 MMOL/L (ref 3.5–5.1)
PROT SERPL-MCNC: 7.3 GM/DL (ref 6–8.4)
RBC # BLD AUTO: 4.38 M/UL (ref 4.6–6.2)
RELATIVE EOSINOPHIL (OHS): 0.4 %
RELATIVE LYMPHOCYTE (OHS): 23.6 % (ref 18–48)
RELATIVE MONOCYTE (OHS): 12.4 % (ref 4–15)
RELATIVE NEUTROPHIL (OHS): 62 % (ref 38–73)
SODIUM SERPL-SCNC: 138 MMOL/L (ref 136–145)
WBC # BLD AUTO: 5.01 K/UL (ref 3.9–12.7)

## 2025-08-11 PROCEDURE — G2211 COMPLEX E/M VISIT ADD ON: HCPCS | Mod: S$GLB,,, | Performed by: INTERNAL MEDICINE

## 2025-08-11 PROCEDURE — 3288F FALL RISK ASSESSMENT DOCD: CPT | Mod: CPTII,S$GLB,, | Performed by: INTERNAL MEDICINE

## 2025-08-11 PROCEDURE — 99999 PR PBB SHADOW E&M-EST. PATIENT-LVL IV: CPT | Mod: PBBFAC,,, | Performed by: INTERNAL MEDICINE

## 2025-08-11 PROCEDURE — 1160F RVW MEDS BY RX/DR IN RCRD: CPT | Mod: CPTII,S$GLB,, | Performed by: INTERNAL MEDICINE

## 2025-08-11 PROCEDURE — 1126F AMNT PAIN NOTED NONE PRSNT: CPT | Mod: CPTII,S$GLB,, | Performed by: INTERNAL MEDICINE

## 2025-08-11 PROCEDURE — 99397 PER PM REEVAL EST PAT 65+ YR: CPT | Mod: 25,S$GLB,, | Performed by: INTERNAL MEDICINE

## 2025-08-11 PROCEDURE — 85025 COMPLETE CBC W/AUTO DIFF WBC: CPT

## 2025-08-11 PROCEDURE — 80053 COMPREHEN METABOLIC PANEL: CPT

## 2025-08-11 PROCEDURE — 99214 OFFICE O/P EST MOD 30 MIN: CPT | Mod: 25,S$GLB,, | Performed by: INTERNAL MEDICINE

## 2025-08-11 PROCEDURE — 1159F MED LIST DOCD IN RCRD: CPT | Mod: CPTII,S$GLB,, | Performed by: INTERNAL MEDICINE

## 2025-08-11 PROCEDURE — 1101F PT FALLS ASSESS-DOCD LE1/YR: CPT | Mod: CPTII,S$GLB,, | Performed by: INTERNAL MEDICINE

## 2025-08-11 PROCEDURE — 36415 COLL VENOUS BLD VENIPUNCTURE: CPT | Mod: PO

## 2025-08-11 PROCEDURE — G0439 PPPS, SUBSEQ VISIT: HCPCS | Mod: S$GLB,,, | Performed by: INTERNAL MEDICINE

## 2025-08-11 RX ORDER — MUPIROCIN 20 MG/G
OINTMENT TOPICAL 3 TIMES DAILY
Qty: 22 G | Refills: 0 | Status: SHIPPED | OUTPATIENT
Start: 2025-08-11 | End: 2025-08-21

## 2025-08-28 ENCOUNTER — PATIENT MESSAGE (OUTPATIENT)
Dept: FAMILY MEDICINE | Facility: CLINIC | Age: 89
End: 2025-08-28
Payer: MEDICARE

## 2025-08-28 DIAGNOSIS — Z98.41 STATUS POST CATARACT EXTRACTION AND INSERTION OF INTRAOCULAR LENS OF RIGHT EYE: ICD-10-CM

## 2025-08-28 DIAGNOSIS — Z96.1 STATUS POST CATARACT EXTRACTION AND INSERTION OF INTRAOCULAR LENS OF RIGHT EYE: ICD-10-CM

## 2025-08-28 DIAGNOSIS — H18.12 BULLOUS KERATOPATHY OF LEFT EYE: Primary | ICD-10-CM
